# Patient Record
Sex: FEMALE | Race: BLACK OR AFRICAN AMERICAN | HISPANIC OR LATINO | Employment: OTHER | URBAN - METROPOLITAN AREA
[De-identification: names, ages, dates, MRNs, and addresses within clinical notes are randomized per-mention and may not be internally consistent; named-entity substitution may affect disease eponyms.]

---

## 2022-04-27 ENCOUNTER — TELEPHONE (OUTPATIENT)
Dept: ORTHOPEDIC SURGERY | Facility: CLINIC | Age: 57
End: 2022-04-27

## 2022-04-27 NOTE — TELEPHONE ENCOUNTER
EVALUATION AFTER BILATERAL KNEE REPLACEMENT, SURGERY WAS PERFORMED BY JAYJAY MCGILL AT [facility]. INDEXED OPERATIVE REPORT 11.20.21, 11.19.21 & 10.23.20, PATHOLOGY REPORT 11.19.21 &10.23.20. PROG NOTE 11.19.21 & 10.23.20. DC SUMMARY 11.21.21 & 10.24.20. XRAY KNEE 11.21.21 & 10.23.20. ALL FROM DR JAYJAY MCGILL.

## 2022-04-28 NOTE — TELEPHONE ENCOUNTER
PER DR LUGO, IF PATIENT IS HAVING PROBLEMS POST SURGERY THEN NO, IF JUST A NORMAL FOLLOW UP THEN YES HE IS OK TO SEE.

## 2022-05-25 ENCOUNTER — TELEPHONE (OUTPATIENT)
Dept: ORTHOPEDIC SURGERY | Facility: CLINIC | Age: 57
End: 2022-05-25

## 2022-05-25 NOTE — TELEPHONE ENCOUNTER
Caller: MILTON HERCULES     Relationship: REFERRED TO OFFICE    Best call back number: [phone]    What form or medical record are you requesting: PATIENTS OP NOTES     Who is requesting this form or medical record from you: REFERRED TO PROVIDER    How would you like to receive the form or medical records (pick-up, mail, fax): FAX   If fax, what is the fax number: [phone] ATTN_ DELISA GERTRUDIS      Timeframe paperwork needed: ASAP    Additional notes: OP NOTES ARE NOT APPEARING IN CARE EVERYWHERE- JUST NEED NOTES SENT TO OFFICE FOR PATIENT. TY

## 2023-03-31 ENCOUNTER — APPOINTMENT (OUTPATIENT)
Dept: CT IMAGING | Facility: HOSPITAL | Age: 58
End: 2023-03-31
Payer: OTHER GOVERNMENT

## 2023-03-31 ENCOUNTER — HOSPITAL ENCOUNTER (EMERGENCY)
Facility: HOSPITAL | Age: 58
Discharge: HOME | End: 2023-04-01
Attending: EMERGENCY MEDICINE | Admitting: EMERGENCY MEDICINE
Payer: OTHER GOVERNMENT

## 2023-03-31 VITALS
WEIGHT: 190.04 LBS | RESPIRATION RATE: 18 BRPM | HEART RATE: 90 BPM | HEIGHT: 66 IN | DIASTOLIC BLOOD PRESSURE: 88 MMHG | OXYGEN SATURATION: 100 % | TEMPERATURE: 97.9 F | BODY MASS INDEX: 30.54 KG/M2 | SYSTOLIC BLOOD PRESSURE: 127 MMHG

## 2023-03-31 DIAGNOSIS — K59.02 CONSTIPATION DUE TO OUTLET SYNDROME: ICD-10-CM

## 2023-03-31 DIAGNOSIS — K76.89 HEPATIC CYST: Primary | ICD-10-CM

## 2023-03-31 DIAGNOSIS — K58.1 IRRITABLE BOWEL SYNDROME WITH CONSTIPATION: ICD-10-CM

## 2023-03-31 LAB
ALBUMIN SERPL-MCNC: 4 G/DL (ref 3.5–5.2)
ALBUMIN/GLOB SERPL: 1.2 G/DL
ALP SERPL-CCNC: 90 U/L (ref 39–117)
ALT SERPL W P-5'-P-CCNC: 13 U/L (ref 1–33)
ANION GAP SERPL CALCULATED.3IONS-SCNC: 12.1 MMOL/L (ref 5–15)
AST SERPL-CCNC: 20 U/L (ref 1–32)
BACTERIA UR QL AUTO: ABNORMAL /HPF
BASOPHILS # BLD AUTO: 0.02 10*3/MM3 (ref 0–0.2)
BASOPHILS NFR BLD AUTO: 0.2 % (ref 0–1.5)
BILIRUB SERPL-MCNC: 0.2 MG/DL (ref 0–1.2)
BILIRUB UR QL STRIP: NEGATIVE
BUN SERPL-MCNC: 14 MG/DL (ref 6–20)
BUN/CREAT SERPL: 14.9 (ref 7–25)
CALCIUM SPEC-SCNC: 9 MG/DL (ref 8.6–10.5)
CHLORIDE SERPL-SCNC: 108 MMOL/L (ref 98–107)
CLARITY UR: CLEAR
CO2 SERPL-SCNC: 18.9 MMOL/L (ref 22–29)
COLOR UR: YELLOW
CREAT SERPL-MCNC: 0.94 MG/DL (ref 0.57–1)
D-LACTATE SERPL-SCNC: 1.5 MMOL/L (ref 0.5–2)
DEPRECATED RDW RBC AUTO: 43 FL (ref 37–54)
EGFRCR SERPLBLD CKD-EPI 2021: 71.8 ML/MIN/1.73
EOSINOPHIL # BLD AUTO: 0.03 10*3/MM3 (ref 0–0.4)
EOSINOPHIL NFR BLD AUTO: 0.3 % (ref 0.3–6.2)
ERYTHROCYTE [DISTWIDTH] IN BLOOD BY AUTOMATED COUNT: 14.4 % (ref 12.3–15.4)
GLOBULIN UR ELPH-MCNC: 3.3 GM/DL
GLUCOSE SERPL-MCNC: 81 MG/DL (ref 65–99)
GLUCOSE UR STRIP-MCNC: NEGATIVE MG/DL
HCT VFR BLD AUTO: 42.7 % (ref 34–46.6)
HGB BLD-MCNC: 13.6 G/DL (ref 12–15.9)
HGB UR QL STRIP.AUTO: NEGATIVE
HOLD SPECIMEN: NORMAL
HOLD SPECIMEN: NORMAL
HYALINE CASTS UR QL AUTO: ABNORMAL /LPF
IMM GRANULOCYTES # BLD AUTO: 0.02 10*3/MM3 (ref 0–0.05)
IMM GRANULOCYTES NFR BLD AUTO: 0.2 % (ref 0–0.5)
KETONES UR QL STRIP: ABNORMAL
LEUKOCYTE ESTERASE UR QL STRIP.AUTO: ABNORMAL
LIPASE SERPL-CCNC: 23 U/L (ref 13–60)
LYMPHOCYTES # BLD AUTO: 1.48 10*3/MM3 (ref 0.7–3.1)
LYMPHOCYTES NFR BLD AUTO: 16.8 % (ref 19.6–45.3)
MCH RBC QN AUTO: 26.4 PG (ref 26.6–33)
MCHC RBC AUTO-ENTMCNC: 31.9 G/DL (ref 31.5–35.7)
MCV RBC AUTO: 82.8 FL (ref 79–97)
MONOCYTES # BLD AUTO: 0.4 10*3/MM3 (ref 0.1–0.9)
MONOCYTES NFR BLD AUTO: 4.5 % (ref 5–12)
NEUTROPHILS NFR BLD AUTO: 6.85 10*3/MM3 (ref 1.7–7)
NEUTROPHILS NFR BLD AUTO: 78 % (ref 42.7–76)
NITRITE UR QL STRIP: NEGATIVE
NRBC BLD AUTO-RTO: 0 /100 WBC (ref 0–0.2)
PH UR STRIP.AUTO: 6 [PH] (ref 5–8)
PLATELET # BLD AUTO: 350 10*3/MM3 (ref 140–450)
PMV BLD AUTO: 8.9 FL (ref 6–12)
POTASSIUM SERPL-SCNC: 4.1 MMOL/L (ref 3.5–5.2)
PROT SERPL-MCNC: 7.3 G/DL (ref 6–8.5)
PROT UR QL STRIP: NEGATIVE
RBC # BLD AUTO: 5.16 10*6/MM3 (ref 3.77–5.28)
RBC # UR STRIP: ABNORMAL /HPF
REF LAB TEST METHOD: ABNORMAL
SODIUM SERPL-SCNC: 139 MMOL/L (ref 136–145)
SP GR UR STRIP: 1.03 (ref 1–1.03)
SQUAMOUS #/AREA URNS HPF: ABNORMAL /HPF
UROBILINOGEN UR QL STRIP: ABNORMAL
WBC # UR STRIP: ABNORMAL /HPF
WBC NRBC COR # BLD: 8.8 10*3/MM3 (ref 3.4–10.8)
WHOLE BLOOD HOLD COAG: NORMAL
WHOLE BLOOD HOLD SPECIMEN: NORMAL

## 2023-03-31 PROCEDURE — 96375 TX/PRO/DX INJ NEW DRUG ADDON: CPT

## 2023-03-31 PROCEDURE — 83690 ASSAY OF LIPASE: CPT | Performed by: EMERGENCY MEDICINE

## 2023-03-31 PROCEDURE — 74176 CT ABD & PELVIS W/O CONTRAST: CPT

## 2023-03-31 PROCEDURE — 36415 COLL VENOUS BLD VENIPUNCTURE: CPT | Performed by: EMERGENCY MEDICINE

## 2023-03-31 PROCEDURE — 96374 THER/PROPH/DIAG INJ IV PUSH: CPT

## 2023-03-31 PROCEDURE — 85025 COMPLETE CBC W/AUTO DIFF WBC: CPT | Performed by: EMERGENCY MEDICINE

## 2023-03-31 PROCEDURE — 83605 ASSAY OF LACTIC ACID: CPT | Performed by: EMERGENCY MEDICINE

## 2023-03-31 PROCEDURE — 81001 URINALYSIS AUTO W/SCOPE: CPT | Performed by: EMERGENCY MEDICINE

## 2023-03-31 PROCEDURE — 25010000002 KETOROLAC TROMETHAMINE PER 15 MG

## 2023-03-31 PROCEDURE — 80053 COMPREHEN METABOLIC PANEL: CPT | Performed by: EMERGENCY MEDICINE

## 2023-03-31 PROCEDURE — 25010000002 ONDANSETRON PER 1 MG: Performed by: NURSE PRACTITIONER

## 2023-03-31 PROCEDURE — 99284 EMERGENCY DEPT VISIT MOD MDM: CPT

## 2023-03-31 RX ORDER — KETOROLAC TROMETHAMINE 30 MG/ML
30 INJECTION, SOLUTION INTRAMUSCULAR; INTRAVENOUS ONCE
Status: COMPLETED | OUTPATIENT
Start: 2023-03-31 | End: 2023-03-31

## 2023-03-31 RX ORDER — SODIUM CHLORIDE 0.9 % (FLUSH) 0.9 %
10 SYRINGE (ML) INJECTION AS NEEDED
Status: DISCONTINUED | OUTPATIENT
Start: 2023-03-31 | End: 2023-04-01 | Stop reason: HOSPADM

## 2023-03-31 RX ORDER — DICYCLOMINE HCL 20 MG
20 TABLET ORAL 2 TIMES DAILY
Qty: 60 TABLET | Refills: 0 | Status: SHIPPED | OUTPATIENT
Start: 2023-03-31

## 2023-03-31 RX ORDER — KETOROLAC TROMETHAMINE 10 MG/1
10 TABLET, FILM COATED ORAL EVERY 6 HOURS PRN
Qty: 15 TABLET | Refills: 0 | Status: SHIPPED | OUTPATIENT
Start: 2023-03-31

## 2023-03-31 RX ORDER — ONDANSETRON 2 MG/ML
4 INJECTION INTRAMUSCULAR; INTRAVENOUS ONCE
Status: COMPLETED | OUTPATIENT
Start: 2023-03-31 | End: 2023-03-31

## 2023-03-31 RX ADMIN — KETOROLAC TROMETHAMINE 30 MG: 30 INJECTION, SOLUTION INTRAMUSCULAR; INTRAVENOUS at 19:20

## 2023-03-31 RX ADMIN — SODIUM CHLORIDE 1000 ML: 9 INJECTION, SOLUTION INTRAVENOUS at 19:22

## 2023-03-31 RX ADMIN — ONDANSETRON 4 MG: 2 INJECTION INTRAMUSCULAR; INTRAVENOUS at 19:21

## 2023-03-31 NOTE — ED PROVIDER NOTES
Time: 6:41 PM EDT  Date of encounter:  3/31/2023  Independent Historian/Clinical History and Information was obtained by:   Patient  Chief Complaint   Patient presents with   • Abdominal Pain     c/o abd pain with n/v today.       History is limited by: N/A    History of Present Illness:  Patient is a 57 y.o. year old female who presents to the emergency department for evaluation of sharp LLQ abd pain started this afternoon.  Patient admits to nausea, diaphoresis and 1 episode of vomiting.  Patient has a history of IBS-C and takes daily stool softeners and another medication.  She states she takes hydrocodone daily prescribed by pain management for her knee and back pains.  Hx of  with scar revision in the past. Denies dysuria.  Patient denies dysuria, hematuria, recent travel, changes in food history of diverticulitis or Crohn's.  Patient denies history of ovarian cyst    HPI    Patient Care Team  Primary Care Provider: Xiang Rico MD    Past Medical History:     Allergies   Allergen Reactions   • Adhesive Tape Rash   • Percocet [Oxycodone-Acetaminophen] Hives     Past Medical History:   Diagnosis Date   • Anxiety    • Arthritis    • Depression    • Injury of back    • Migraines    • PTSD (post-traumatic stress disorder)      Past Surgical History:   Procedure Laterality Date   • ABDOMINAL SURGERY     • CARPAL TUNNEL RELEASE     •  SECTION     • KNEE ACL RECONSTRUCTION     • OTHER SURGICAL HISTORY      right dorsal compartment release   • REPLACEMENT TOTAL KNEE BILATERAL     • REVISION OF ABDOMINAL SCAR     • TONSILLECTOMY       History reviewed. No pertinent family history.    Home Medications:  Prior to Admission medications    Medication Sig Start Date End Date Taking? Authorizing Provider   benzonatate (TESSALON) 200 MG capsule Take 1 capsule by mouth 3 (Three) Times a Day As Needed for Cough. 6/10/22   Sanjuanita Myrick APRN   buPROPion (WELLBUTRIN) 100 MG tablet Take 1 tablet by  mouth 2 (Two) Times a Day.    Yelitza Crook MD   carboxymethylcellulose (REFRESH PLUS) 0.5 % solution 3 (Three) Times a Day As Needed for Dry Eyes.    Yelitza Crook MD   celecoxib (CeleBREX) 50 MG capsule Take 50 mg by mouth.    Nurse Scott Jurado RN   cyclobenzaprine (FLEXERIL) 10 MG tablet Take 1 tablet by mouth 3 (Three) Times a Day As Needed for Muscle Spasms.    Yelitza Crook MD   docusate sodium 100 MG capsule Take 100 mg by mouth.    Nurse Scott Jurado RN   dronabinol (MARINOL) 5 MG capsule Take 5 mg by mouth 2 (Two) Times a Day Before Meals.    Nurse Scott Jurado RN   escitalopram (LEXAPRO) 20 MG tablet Take 20 mg by mouth.    Nurse Scott Jurado RN   HYDROcodone-acetaminophen (NORCO)  MG per tablet Take 1 tablet by mouth Every 6 (Six) Hours As Needed for Moderate Pain.    Yelitza Crook MD   Lidocaine (HM Lidocaine Patch) 4 % patch Apply  topically.    Yelitza Crook MD   meclizine (ANTIVERT) 25 MG tablet Take 25 mg by mouth.    Nurse Scott Jurado RN   metoclopramide (REGLAN) 10 MG tablet Take 1 tablet by mouth 4 (Four) Times a Day Before Meals & at Bedtime.    Yelitza Crook MD   ondansetron (ZOFRAN) 4 MG tablet Take  by mouth.    Nurse Scott Jurado RN   SUMAtriptan-naproxen (TREXIMET)  MG per tablet Take 1 tablet by mouth.    Nurse Scott Jurado RN   topiramate (Topamax) 100 MG tablet Take 1 tablet by mouth 2 (Two) Times a Day.    Yelitza Crook MD   traMADol (ULTRAM) 50 MG tablet Take  by mouth.    Nurse Scott Jurado RN        Social History:   Social History     Tobacco Use   • Smoking status: Never   • Smokeless tobacco: Never   Vaping Use   • Vaping Use: Never used   Substance Use Topics   • Alcohol use: Never   • Drug use: Never         Review of Systems:  Review of Systems   Constitutional: Positive for diaphoresis. Negative for fever.   HENT: Negative.    Eyes: Negative.    Respiratory: Negative.   "  Cardiovascular: Negative.    Gastrointestinal: Positive for abdominal pain, nausea and vomiting. Negative for diarrhea.   Endocrine: Negative.    Genitourinary: Negative.  Negative for dysuria and hematuria.   Musculoskeletal: Negative.    Skin: Negative.    Allergic/Immunologic: Negative.    Neurological: Negative.    Hematological: Negative.    Psychiatric/Behavioral: Negative.         Physical Exam:  /88   Pulse 90   Temp 97.9 °F (36.6 °C) (Oral)   Resp 18   Ht 167.6 cm (66\")   Wt 86.2 kg (190 lb 0.6 oz)   SpO2 100%   BMI 30.67 kg/m²     Physical Exam  Vitals and nursing note reviewed.   Constitutional:       Appearance: Normal appearance.   HENT:      Head: Normocephalic and atraumatic.      Nose: Nose normal.      Mouth/Throat:      Mouth: Mucous membranes are moist.   Eyes:      Extraocular Movements: Extraocular movements intact.      Conjunctiva/sclera: Conjunctivae normal.      Pupils: Pupils are equal, round, and reactive to light.   Cardiovascular:      Rate and Rhythm: Normal rate and regular rhythm.      Heart sounds: Normal heart sounds.   Pulmonary:      Effort: Pulmonary effort is normal.      Breath sounds: Normal breath sounds.   Abdominal:      General: Abdomen is flat. Bowel sounds are normal.      Palpations: Abdomen is soft.      Tenderness: There is abdominal tenderness (LLQ). There is no guarding or rebound.   Musculoskeletal:         General: Normal range of motion.      Cervical back: Normal range of motion and neck supple.   Skin:     General: Skin is warm and dry.   Neurological:      General: No focal deficit present.      Mental Status: She is alert and oriented to person, place, and time.   Psychiatric:         Mood and Affect: Mood normal.         Behavior: Behavior normal.                  Procedures:  Procedures      Medical Decision Making:      Comorbidities that affect care:    IBS-C    External Notes reviewed:    None      The following orders were placed and all " results were independently analyzed by me:  Orders Placed This Encounter   Procedures   • CT Abdomen Pelvis Without Contrast   • Tillson Draw   • Comprehensive Metabolic Panel   • Lipase   • Urinalysis With Microscopic If Indicated (No Culture) - Urine, Clean Catch   • Lactic Acid, Plasma   • CBC Auto Differential   • Urinalysis, Microscopic Only - Urine, Clean Catch   • NPO Diet NPO Type: Strict NPO   • Undress & Gown   • Insert Peripheral IV   • Insert Peripheral IV   • CBC & Differential   • Green Top (Gel)   • Lavender Top   • Gold Top - SST   • Light Blue Top       Medications Given in the Emergency Department:  Medications   sodium chloride 0.9 % flush 10 mL (has no administration in time range)   sodium chloride 0.9 % flush 10 mL (has no administration in time range)   sodium chloride 0.9 % bolus 1,000 mL (0 mL Intravenous Stopped 3/31/23 2127)   ondansetron (ZOFRAN) injection 4 mg (4 mg Intravenous Given 3/31/23 1921)   ketorolac (TORADOL) injection 30 mg (30 mg Intravenous Given 3/31/23 1920)        ED Course:    The patient was initially evaluated in the triage area where orders were placed. The patient was later dispositioned by Larry Koehler PA-C.      The patient was advised to stay for completion of workup which includes but is not limited to communication of labs and radiological results, reassessment and plan. The patient was advised that leaving prior to disposition by a provider could result in critical findings that are not communicated to the patient.     ED Course as of 03/31/23 2337   Fri Mar 31, 2023   1839 The patient was seen and examined by me, PIOTR Dasilva, while in triage. Orders placed. Patient is awaiting disposition.   [AR]   2332 Had extensive talk with the patient by her CT findings of a liver cyst along with constipation.  Patient states that she has had Reglan in the past that has not made her constipation worse and she would like to be prescribed that along with  GoLytely.  She will follow-up with her GI doctor along with her primary care for the findings.  [AJ]      ED Course User Index  [AJ] Larry Koehler PA-C  [AR] Miranda Stone APRN       Labs:    Lab Results (last 24 hours)     Procedure Component Value Units Date/Time    CBC & Differential [[id number]]  (Abnormal) Collected: 03/31/23 1854    Specimen: Blood from Arm, Right Updated: 03/31/23 1906    Narrative:      The following orders were created for panel order CBC & Differential.  Procedure                               Abnormality         Status                     ---------                               -----------         ------                     CBC Auto Differential[[id number]]        Abnormal            Final result                 Please view results for these tests on the individual orders.    Lipase [[id number]]  (Normal) Collected: 03/31/23 1854    Specimen: Blood from Arm, Right Updated: 03/31/23 1923     Lipase 23 U/L     CBC Auto Differential [[id number]]  (Abnormal) Collected: 03/31/23 1854    Specimen: Blood from Arm, Right Updated: 03/31/23 1906     WBC 8.80 10*3/mm3      RBC 5.16 10*6/mm3      Hemoglobin 13.6 g/dL      Hematocrit 42.7 %      MCV 82.8 fL      MCH 26.4 pg      MCHC 31.9 g/dL      RDW 14.4 %      RDW-SD 43.0 fl      MPV 8.9 fL      Platelets 350 10*3/mm3      Neutrophil % 78.0 %      Lymphocyte % 16.8 %      Monocyte % 4.5 %      Eosinophil % 0.3 %      Basophil % 0.2 %      Immature Grans % 0.2 %      Neutrophils, Absolute 6.85 10*3/mm3      Lymphocytes, Absolute 1.48 10*3/mm3      Monocytes, Absolute 0.40 10*3/mm3      Eosinophils, Absolute 0.03 10*3/mm3      Basophils, Absolute 0.02 10*3/mm3      Immature Grans, Absolute 0.02 10*3/mm3      nRBC 0.0 /100 WBC     Comprehensive Metabolic Panel [[id number]]  (Abnormal) Collected: 03/31/23 2126    Specimen: Blood from Hand, Left Updated: 03/31/23 2217     Glucose 81 mg/dL      BUN 14 mg/dL      Creatinine 0.94 mg/dL      Sodium  139 mmol/L      Potassium 4.1 mmol/L      Comment: Slight hemolysis detected by analyzer. Results may be affected.        Chloride 108 mmol/L      CO2 18.9 mmol/L      Calcium 9.0 mg/dL      Total Protein 7.3 g/dL      Albumin 4.0 g/dL      ALT (SGPT) 13 U/L      AST (SGOT) 20 U/L      Comment: Slight hemolysis detected by analyzer. Results may be affected.        Alkaline Phosphatase 90 U/L      Total Bilirubin 0.2 mg/dL      Globulin 3.3 gm/dL      A/G Ratio 1.2 g/dL      BUN/Creatinine Ratio 14.9     Anion Gap 12.1 mmol/L      eGFR 71.8 mL/min/1.73     Narrative:      GFR Normal >60  Chronic Kidney Disease <60  Kidney Failure <15      Lactic Acid, Plasma [[id number]]  (Normal) Collected: 03/31/23 2126    Specimen: Blood from Hand, Left Updated: 03/31/23 2214     Lactate 1.5 mmol/L     Urinalysis With Microscopic If Indicated (No Culture) - Urine, Clean Catch [[id number]]  (Abnormal) Collected: 03/31/23 2159    Specimen: Urine, Clean Catch Updated: 03/31/23 2235     Color, UA Yellow     Appearance, UA Clear     pH, UA 6.0     Specific Gravity, UA 1.026     Glucose, UA Negative     Ketones, UA Trace     Bilirubin, UA Negative     Blood, UA Negative     Protein, UA Negative     Leuk Esterase, UA Trace     Nitrite, UA Negative     Urobilinogen, UA 1.0 E.U./dL    Urinalysis, Microscopic Only - Urine, Clean Catch [[id number]]  (Abnormal) Collected: 03/31/23 2159    Specimen: Urine, Clean Catch Updated: 03/31/23 2319     RBC, UA None Seen /HPF      WBC, UA 3-5 /HPF      Bacteria, UA None Seen /HPF      Squamous Epithelial Cells, UA 0-2 /HPF      Hyaline Casts, UA 0-2 /LPF      Methodology Manual Light Microscopy           Imaging:    CT Abdomen Pelvis Without Contrast    Result Date: 3/31/2023  PROCEDURE: CT ABDOMEN PELVIS WO CONTRAST  COMPARISON: None  INDICATIONS: LLQ abd pain  TECHNIQUE: CT images were created without intravenous contrast.   PROTOCOL:   Standard imaging protocol performed    RADIATION:   DLP: 477mGy*cm    Automated exposure control was utilized to minimize radiation dose.  FINDINGS:  There are hepatic lesions suggestive of cysts.  Lesion in right lobe might relate to hemangioma.  There is no definite abnormality of the spleen or acute abnormality of the pancreas.  The kidneys are grossly unremarkable.  The bladder does not appear abnormal for the degree of distention.  There is a moderate stool burden which could relate to constipation.  There are multilevel degenerative changes involving the lumbar spine most pronounced at L3-4 and L4-5.        1. Hepatic lesions that may relate to cyst and hemangioma. 2. Moderate stool burden which could relate to constipation. 3. Multilevel degenerative changes lumbar spine.     IRMA BARAJAS MD       Electronically Signed and Approved By: IRMA BARAJAS MD on 3/31/2023 at 19:56                 Differential Diagnosis and Discussion:      Abdominal Pain: Based on the patient's signs and symptoms, I considered abdominal aortic aneurysm, small bowel obstruction, pancreatitis, acute cholecystitis, acute appendecitis, peptic ulcer disease, gastritis, colitis, endocrine disorders, irritable bowel syndrome and other differential diagnosis an etiology of the patient's abdominal pain.    All labs were reviewed and interpreted by me.  CT scan radiology interpretation was reviewed by me.    [facility]     Patient Care Considerations:    NARCOTICS: I considered prescribing opiate pain medication as an outpatient, however However due to patient's history of IBS-C decided to go with a different option.      Consultants/Shared Management Plan:    None    Social Determinants of Health:    Patient is independent, reliable, and has access to care.       Disposition and Care Coordination:    Discharged: The patient is suitable and stable for discharge with no need for consideration of observation or admission.    I have explained the patient´s condition, diagnoses and treatment plan based on the information  available to me at this time. I have answered questions and addressed any concerns. The patient has a good  understanding of the patient´s diagnosis, condition, and treatment plan as can be expected at this point. The vital signs have been stable. The patient´s condition is stable and appropriate for discharge from the emergency department.      The patient will pursue further outpatient evaluation with the primary care physician or other designated or consulting physician as outlined in the discharge instructions. They are agreeable to this plan of care and follow-up instructions have been explained in detail. The patient has received these instructions in written format and have expressed an understanding of the discharge instructions. The patient is aware that any significant change in condition or worsening of symptoms should prompt an immediate return to this or the closest emergency department or call to 911.  I have explained discharge medications and the need for follow up with the patient/caretakers. This was also printed in the discharge instructions. Patient was discharged with the following medications and follow up:      Medication List      New Prescriptions    dicyclomine 20 MG tablet  Commonly known as: BENTYL  Take 1 tablet by mouth 2 (Two) Times a Day.     ketorolac 10 MG tablet  Commonly known as: TORADOL  Take 1 tablet by mouth Every 6 (Six) Hours As Needed for Moderate Pain.     polyethylene glycol 236 g solution  Commonly known as: GoLYTELY  Take 240 mL by mouth 1 (One) Time for 1 dose.  Start taking on: April 1, 2023           Where to Get Your Medications      These medications were sent to [facility]/pharmacy #[id number] - [place], [place] - [address] - [phone] [facility] [phone] FX  [address] [place] [address]    Hours: 24-hours Phone: [phone]   · dicyclomine 20 MG tablet  · ketorolac 10 MG tablet  · polyethylene glycol 236 g solution      No follow-up provider specified.      Final diagnoses:   Hepatic cyst   Irritable bowel syndrome with constipation (hx of)   Constipation due to outlet syndrome        ED Disposition     ED Disposition   Discharge    Condition   Stable    Comment   --             This medical record created using voice recognition software.           Larry Koehler PA-C  03/31/23 0843

## 2023-04-01 NOTE — DISCHARGE INSTRUCTIONS
Please take Bentyl as needed for abdominal pain and cramping  Please drink lots of fluids and increase your fiber intake  Please follow-up with your GI doctor for IBS-C  Please follow-up with your primary care for herpetic cyst.

## 2023-04-11 ENCOUNTER — TRANSCRIBE ORDERS (OUTPATIENT)
Dept: ADMINISTRATIVE | Facility: HOSPITAL | Age: 58
End: 2023-04-11
Payer: OTHER GOVERNMENT

## 2023-04-11 DIAGNOSIS — U09.9 POST-COVID SYNDROME: Primary | ICD-10-CM

## 2023-04-11 DIAGNOSIS — R91.1 PULMONARY NODULE: ICD-10-CM

## 2023-05-12 ENCOUNTER — HOSPITAL ENCOUNTER (OUTPATIENT)
Dept: CT IMAGING | Facility: HOSPITAL | Age: 58
Discharge: HOME | End: 2023-05-12
Payer: OTHER GOVERNMENT

## 2023-05-12 ENCOUNTER — HOSPITAL ENCOUNTER (OUTPATIENT)
Dept: RESPIRATORY THERAPY | Facility: HOSPITAL | Age: 58
Discharge: HOME | End: 2023-05-12
Payer: OTHER GOVERNMENT

## 2023-05-12 DIAGNOSIS — U09.9 POST-COVID SYNDROME: ICD-10-CM

## 2023-05-12 PROCEDURE — 71250 CT THORAX DX C-: CPT

## 2023-05-12 PROCEDURE — 94010 BREATHING CAPACITY TEST: CPT

## 2023-05-12 PROCEDURE — 94726 PLETHYSMOGRAPHY LUNG VOLUMES: CPT

## 2024-01-16 ENCOUNTER — TRANSCRIBE ORDERS (OUTPATIENT)
Dept: ADMINISTRATIVE | Facility: HOSPITAL | Age: 59
End: 2024-01-16
Payer: OTHER GOVERNMENT

## 2024-01-16 DIAGNOSIS — K76.9 LIVER LESION: ICD-10-CM

## 2024-01-16 DIAGNOSIS — R10.9 STOMACH ACHE: Primary | ICD-10-CM

## 2024-02-13 ENCOUNTER — HOSPITAL ENCOUNTER (OUTPATIENT)
Dept: MRI IMAGING | Facility: HOSPITAL | Age: 59
Discharge: HOME | End: 2024-02-13
Admitting: INTERNAL MEDICINE
Payer: OTHER GOVERNMENT

## 2024-02-13 DIAGNOSIS — R10.9 STOMACH ACHE: ICD-10-CM

## 2024-02-13 DIAGNOSIS — K76.9 LIVER LESION: ICD-10-CM

## 2024-10-19 ENCOUNTER — APPOINTMENT (OUTPATIENT)
Dept: GENERAL RADIOLOGY | Facility: HOSPITAL | Age: 59
End: 2024-10-19
Payer: OTHER GOVERNMENT

## 2024-10-19 ENCOUNTER — HOSPITAL ENCOUNTER (EMERGENCY)
Facility: HOSPITAL | Age: 59
Discharge: HOME | End: 2024-10-20
Attending: EMERGENCY MEDICINE | Admitting: EMERGENCY MEDICINE
Payer: OTHER GOVERNMENT

## 2024-10-19 DIAGNOSIS — R10.9 ABDOMINAL PAIN, UNSPECIFIED ABDOMINAL LOCATION: Primary | ICD-10-CM

## 2024-10-19 DIAGNOSIS — K58.9 IRRITABLE BOWEL SYNDROME WITHOUT DIARRHEA: ICD-10-CM

## 2024-10-19 DIAGNOSIS — K59.00 CONSTIPATION, UNSPECIFIED CONSTIPATION TYPE: ICD-10-CM

## 2024-10-19 LAB
ALBUMIN SERPL-MCNC: 4.2 G/DL (ref 3.5–5.2)
ALBUMIN/GLOB SERPL: 1.2 G/DL
ALP SERPL-CCNC: 92 U/L (ref 39–117)
ALT SERPL W P-5'-P-CCNC: 14 U/L (ref 1–33)
ANION GAP SERPL CALCULATED.3IONS-SCNC: 12.1 MMOL/L (ref 5–15)
AST SERPL-CCNC: 22 U/L (ref 1–32)
BASOPHILS # BLD AUTO: 0.03 10*3/MM3 (ref 0–0.2)
BASOPHILS NFR BLD AUTO: 0.5 % (ref 0–1.5)
BILIRUB SERPL-MCNC: 0.2 MG/DL (ref 0–1.2)
BUN SERPL-MCNC: 17 MG/DL (ref 6–20)
BUN/CREAT SERPL: 19.5 (ref 7–25)
CALCIUM SPEC-SCNC: 9.6 MG/DL (ref 8.6–10.5)
CHLORIDE SERPL-SCNC: 106 MMOL/L (ref 98–107)
CO2 SERPL-SCNC: 20.9 MMOL/L (ref 22–29)
CREAT SERPL-MCNC: 0.87 MG/DL (ref 0.57–1)
D-LACTATE SERPL-SCNC: 1.5 MMOL/L (ref 0.5–2)
DEPRECATED RDW RBC AUTO: 43.2 FL (ref 37–54)
EGFRCR SERPLBLD CKD-EPI 2021: 77.8 ML/MIN/1.73
EOSINOPHIL # BLD AUTO: 0.05 10*3/MM3 (ref 0–0.4)
EOSINOPHIL NFR BLD AUTO: 0.8 % (ref 0.3–6.2)
ERYTHROCYTE [DISTWIDTH] IN BLOOD BY AUTOMATED COUNT: 14.4 % (ref 12.3–15.4)
GLOBULIN UR ELPH-MCNC: 3.4 GM/DL
GLUCOSE SERPL-MCNC: 111 MG/DL (ref 65–99)
HCT VFR BLD AUTO: 40.3 % (ref 34–46.6)
HGB BLD-MCNC: 12.8 G/DL (ref 12–15.9)
HOLD SPECIMEN: NORMAL
HOLD SPECIMEN: NORMAL
IMM GRANULOCYTES # BLD AUTO: 0.01 10*3/MM3 (ref 0–0.05)
IMM GRANULOCYTES NFR BLD AUTO: 0.2 % (ref 0–0.5)
LIPASE SERPL-CCNC: 26 U/L (ref 13–60)
LYMPHOCYTES # BLD AUTO: 3.36 10*3/MM3 (ref 0.7–3.1)
LYMPHOCYTES NFR BLD AUTO: 50.6 % (ref 19.6–45.3)
MCH RBC QN AUTO: 26.3 PG (ref 26.6–33)
MCHC RBC AUTO-ENTMCNC: 31.8 G/DL (ref 31.5–35.7)
MCV RBC AUTO: 82.8 FL (ref 79–97)
MONOCYTES # BLD AUTO: 0.68 10*3/MM3 (ref 0.1–0.9)
MONOCYTES NFR BLD AUTO: 10.2 % (ref 5–12)
NEUTROPHILS NFR BLD AUTO: 2.51 10*3/MM3 (ref 1.7–7)
NEUTROPHILS NFR BLD AUTO: 37.7 % (ref 42.7–76)
NRBC BLD AUTO-RTO: 0 /100 WBC (ref 0–0.2)
PLATELET # BLD AUTO: 348 10*3/MM3 (ref 140–450)
PMV BLD AUTO: 9 FL (ref 6–12)
POTASSIUM SERPL-SCNC: 3.7 MMOL/L (ref 3.5–5.2)
PROT SERPL-MCNC: 7.6 G/DL (ref 6–8.5)
RBC # BLD AUTO: 4.87 10*6/MM3 (ref 3.77–5.28)
SODIUM SERPL-SCNC: 139 MMOL/L (ref 136–145)
WBC NRBC COR # BLD AUTO: 6.64 10*3/MM3 (ref 3.4–10.8)
WHOLE BLOOD HOLD COAG: NORMAL
WHOLE BLOOD HOLD SPECIMEN: NORMAL

## 2024-10-19 PROCEDURE — 80053 COMPREHEN METABOLIC PANEL: CPT | Performed by: EMERGENCY MEDICINE

## 2024-10-19 PROCEDURE — 85025 COMPLETE CBC W/AUTO DIFF WBC: CPT | Performed by: EMERGENCY MEDICINE

## 2024-10-19 PROCEDURE — 96374 THER/PROPH/DIAG INJ IV PUSH: CPT

## 2024-10-19 PROCEDURE — 25010000002 ONDANSETRON PER 1 MG: Performed by: EMERGENCY MEDICINE

## 2024-10-19 PROCEDURE — 36415 COLL VENOUS BLD VENIPUNCTURE: CPT

## 2024-10-19 PROCEDURE — 74019 RADEX ABDOMEN 2 VIEWS: CPT

## 2024-10-19 PROCEDURE — 83690 ASSAY OF LIPASE: CPT | Performed by: EMERGENCY MEDICINE

## 2024-10-19 PROCEDURE — 83605 ASSAY OF LACTIC ACID: CPT | Performed by: EMERGENCY MEDICINE

## 2024-10-19 PROCEDURE — 99284 EMERGENCY DEPT VISIT MOD MDM: CPT

## 2024-10-19 RX ORDER — SODIUM CHLORIDE 0.9 % (FLUSH) 0.9 %
10 SYRINGE (ML) INJECTION AS NEEDED
Status: DISCONTINUED | OUTPATIENT
Start: 2024-10-19 | End: 2024-10-20 | Stop reason: HOSPADM

## 2024-10-19 RX ORDER — ONDANSETRON 2 MG/ML
4 INJECTION INTRAMUSCULAR; INTRAVENOUS ONCE
Status: COMPLETED | OUTPATIENT
Start: 2024-10-19 | End: 2024-10-19

## 2024-10-19 RX ORDER — LUBIPROSTONE 8 UG/1
8 CAPSULE ORAL ONCE
Status: COMPLETED | OUTPATIENT
Start: 2024-10-19 | End: 2024-10-19

## 2024-10-19 RX ORDER — MAGNESIUM CARB/ALUMINUM HYDROX 105-160MG
296 TABLET,CHEWABLE ORAL ONCE
Status: COMPLETED | OUTPATIENT
Start: 2024-10-19 | End: 2024-10-19

## 2024-10-19 RX ADMIN — ONDANSETRON HYDROCHLORIDE 4 MG: 2 SOLUTION INTRAMUSCULAR; INTRAVENOUS at 22:42

## 2024-10-19 RX ADMIN — MAJOR MAGNESIUM CITRATE ORAL SOLUTION - LEMON 296 ML: 1.75 LIQUID ORAL at 23:48

## 2024-10-19 RX ADMIN — LUBIPROSTONE 8 MCG: 8 CAPSULE, GELATIN COATED ORAL at 23:47

## 2024-10-20 ENCOUNTER — APPOINTMENT (OUTPATIENT)
Dept: CT IMAGING | Facility: HOSPITAL | Age: 59
End: 2024-10-20
Payer: OTHER GOVERNMENT

## 2024-10-20 VITALS
HEART RATE: 80 BPM | RESPIRATION RATE: 20 BRPM | OXYGEN SATURATION: 100 % | SYSTOLIC BLOOD PRESSURE: 132 MMHG | TEMPERATURE: 98 F | DIASTOLIC BLOOD PRESSURE: 77 MMHG

## 2024-10-20 PROCEDURE — 74176 CT ABD & PELVIS W/O CONTRAST: CPT

## 2024-10-20 PROCEDURE — 96375 TX/PRO/DX INJ NEW DRUG ADDON: CPT

## 2024-10-20 PROCEDURE — 25010000002 KETOROLAC TROMETHAMINE PER 15 MG: Performed by: EMERGENCY MEDICINE

## 2024-10-20 RX ORDER — KETOROLAC TROMETHAMINE 30 MG/ML
30 INJECTION, SOLUTION INTRAMUSCULAR; INTRAVENOUS ONCE
Status: COMPLETED | OUTPATIENT
Start: 2024-10-20 | End: 2024-10-20

## 2024-10-20 RX ADMIN — KETOROLAC TROMETHAMINE 30 MG: 30 INJECTION, SOLUTION INTRAMUSCULAR; INTRAVENOUS at 00:33

## 2024-10-20 NOTE — ED PROVIDER NOTES
Time: 11:17 PM EDT  Date of encounter:  10/19/2024  Independent Historian/Clinical History and Information was obtained by:   Patient  Chief Complaint: Abdominal pain and nausea    History is limited by: N/A    History of Present Illness:  Patient is a 59 y.o. year old female who presents to the emergency department for evaluation of abdominal pain and nausea.  The patient notes that she has chronic IBS with constipation.  The patient states that she has had a complete workup for this and she has had it for several years.  The patient's had EGD, colonoscopy, gastric emptying study, motility studies.  The patient states that her bowel movements are not normal.  She states her last bowel movement was 2 weeks ago which is abnormal for her as well.  The patient states that she began having sharp abdominal pain tonight.  She locates it diffusely throughout the abdomen.  The patient states that she was nauseated and threw up.  She denies any coffee-ground emesis or hematemesis.  Patient denies any fever rigors or myalgias.  The patient states that she does have lightheadedness or near syncope when she gets nauseated and throws up.  She did have a spell that tonight when she was at the restaurant.  Due to her nausea and abdominal pain she came to the emergency room.  She states she does get this pain frequently but she has the severe episodes several times a year.    HPI    Patient Care Team  Primary Care Provider: Xiang Rico MD    Past Medical History:     Allergies   Allergen Reactions    Adhesive Tape Rash    Percocet [Oxycodone-Acetaminophen] Hives     Past Medical History:   Diagnosis Date    Anxiety     Arthritis     Depression     Injury of back     Migraines     PTSD (post-traumatic stress disorder)      Past Surgical History:   Procedure Laterality Date    ABDOMINAL SURGERY      CARPAL TUNNEL RELEASE       SECTION      KNEE ACL RECONSTRUCTION      OTHER SURGICAL HISTORY      right dorsal  compartment release    REPLACEMENT TOTAL KNEE BILATERAL      REVISION OF ABDOMINAL SCAR      TONSILLECTOMY       No family history on file.    Home Medications:  Prior to Admission medications    Medication Sig Start Date End Date Taking? Authorizing Provider   benzonatate (TESSALON) 200 MG capsule Take 1 capsule by mouth 3 (Three) Times a Day As Needed for Cough. 6/10/22   Sanjuanita Myrick APRN   buPROPion (WELLBUTRIN) 100 MG tablet Take 1 tablet by mouth 2 (Two) Times a Day.    Yelitza Crook MD   carboxymethylcellulose (REFRESH PLUS) 0.5 % solution 3 (Three) Times a Day As Needed for Dry Eyes.    Yelitza Crook MD   cyclobenzaprine (FLEXERIL) 10 MG tablet Take 1 tablet by mouth 3 (Three) Times a Day As Needed for Muscle Spasms.    Yelitza Crook MD   dicyclomine (BENTYL) 20 MG tablet Take 1 tablet by mouth 2 (Two) Times a Day. 3/31/23   Larry Koehler PA-C   docusate sodium 100 MG capsule Take 100 mg by mouth.    Nurse Scott Jurado RN   dronabinol (MARINOL) 5 MG capsule Take 5 mg by mouth 2 (Two) Times a Day Before Meals.    Nurse Scott Jurado RN   escitalopram (LEXAPRO) 20 MG tablet Take 20 mg by mouth.    Nurse Scott Jurado RN   HYDROcodone-acetaminophen (NORCO)  MG per tablet Take 1 tablet by mouth Every 6 (Six) Hours As Needed for Moderate Pain.    Yelitza Crook MD   ketorolac (TORADOL) 10 MG tablet Take 1 tablet by mouth Every 6 (Six) Hours As Needed for Moderate Pain. 3/31/23   Larry Koehler PA-C   Lidocaine (HM Lidocaine Patch) 4 % patch Apply  topically.    Yelitza Crook MD   meclizine (ANTIVERT) 25 MG tablet Take 25 mg by mouth.    Nurse Scott Jurado RN   metoclopramide (REGLAN) 10 MG tablet Take 1 tablet by mouth 4 (Four) Times a Day Before Meals & at Bedtime.    Yelitza Crook MD   ondansetron (ZOFRAN) 4 MG tablet Take  by mouth.    Nurse Scott Jurado RN   topiramate (Topamax) 100 MG tablet Take 1 tablet by mouth  2 (Two) Times a Day.    Provider, MD Yelitza   traMADol (ULTRAM) 50 MG tablet Take  by mouth.    Emergency, Nurse Scott, RN        Social History:   Social History     Tobacco Use    Smoking status: Never    Smokeless tobacco: Never   Vaping Use    Vaping status: Never Used   Substance Use Topics    Alcohol use: Never    Drug use: Never         Review of Systems:  Review of Systems   Constitutional:  Negative for chills, diaphoresis and fever.   HENT:  Negative for congestion, postnasal drip, rhinorrhea and sore throat.    Eyes:  Negative for photophobia.   Respiratory:  Negative for cough, chest tightness and shortness of breath.    Cardiovascular:  Negative for chest pain, palpitations and leg swelling.   Gastrointestinal:  Positive for abdominal pain, constipation, nausea and vomiting. Negative for diarrhea.   Genitourinary:  Negative for difficulty urinating, dysuria, flank pain, frequency, hematuria and urgency.   Musculoskeletal:  Negative for neck pain and neck stiffness.   Skin:  Negative for pallor and rash.   Neurological:  Negative for dizziness, syncope, weakness, numbness and headaches.   Hematological:  Negative for adenopathy. Does not bruise/bleed easily.   Psychiatric/Behavioral: Negative.          Physical Exam:  BP (!) 138/113 (BP Location: Right arm, Patient Position: Lying)   Pulse 75   Temp 98 °F (36.7 °C) (Oral)   Resp 20   SpO2 99%     Physical Exam  Vitals and nursing note reviewed.   Constitutional:       General: She is not in acute distress.     Appearance: Normal appearance. She is not ill-appearing, toxic-appearing or diaphoretic.   HENT:      Head: Normocephalic and atraumatic.      Mouth/Throat:      Mouth: Mucous membranes are moist.   Eyes:      Pupils: Pupils are equal, round, and reactive to light.   Cardiovascular:      Rate and Rhythm: Normal rate and regular rhythm.      Pulses: Normal pulses.           Carotid pulses are 2+ on the right side and 2+ on the left side.        Radial pulses are 2+ on the right side and 2+ on the left side.        Femoral pulses are 2+ on the right side and 2+ on the left side.       Popliteal pulses are 2+ on the right side and 2+ on the left side.        Dorsalis pedis pulses are 2+ on the right side and 2+ on the left side.        Posterior tibial pulses are 2+ on the right side and 2+ on the left side.      Heart sounds: Normal heart sounds. No murmur heard.  Pulmonary:      Effort: Pulmonary effort is normal. No accessory muscle usage, respiratory distress or retractions.      Breath sounds: Normal breath sounds. No wheezing, rhonchi or rales.   Abdominal:      General: Abdomen is flat. There is distension.      Palpations: Abdomen is soft. There is no mass or pulsatile mass.      Tenderness: There is generalized abdominal tenderness. There is no right CVA tenderness, left CVA tenderness, guarding or rebound.      Comments: No rigidity   Musculoskeletal:         General: No swelling, tenderness or deformity.      Cervical back: Neck supple. No tenderness.      Right lower leg: No edema.      Left lower leg: No edema.   Skin:     General: Skin is warm and dry.      Capillary Refill: Capillary refill takes less than 2 seconds.      Coloration: Skin is not jaundiced or pale.      Findings: No erythema.   Neurological:      General: No focal deficit present.      Mental Status: She is alert and oriented to person, place, and time. Mental status is at baseline.      Cranial Nerves: Cranial nerves 2-12 are intact. No cranial nerve deficit.      Sensory: Sensation is intact. No sensory deficit.      Motor: Motor function is intact. No weakness or pronator drift.      Coordination: Coordination is intact. Coordination normal.   Psychiatric:         Mood and Affect: Mood normal.         Behavior: Behavior normal.                  Procedures:  Procedures      Medical Decision Making:      Comorbidities that affect care:    Arthritis, PTSD, anxiety, IBS with  chronic constipation, depression, migraine    External Notes reviewed:    None      The following orders were placed and all results were independently analyzed by me:  Orders Placed This Encounter   Procedures    XR Abdomen Flat & Upright    CT Abdomen Pelvis Without Contrast    [place] Draw    Comprehensive Metabolic Panel    Lipase    Urinalysis With Microscopic If Indicated (No Culture) - Urine, Clean Catch    Lactic Acid, Plasma    CBC Auto Differential    NPO Diet NPO Type: Strict NPO    Undress & Gown    Soap suds enema    Insert Peripheral IV    CBC & Differential    Green Top (Gel)    Lavender Top    Gold Top - SST    Light Blue Top       Medications Given in the Emergency Department:  Medications   sodium chloride 0.9 % flush 10 mL (has no administration in time range)   ondansetron (ZOFRAN) injection 4 mg (4 mg Intravenous Given 10/19/24 2242)   magnesium citrate solution 296 mL (296 mL Oral Given 10/19/24 2348)   lubiprostone (AMITIZA) capsule 8 mcg (8 mcg Oral Given 10/19/24 2347)   ketorolac (TORADOL) injection 30 mg (30 mg Intravenous Given 10/20/24 0033)        ED Course:         Labs:    Lab Results (last 24 hours)       Procedure Component Value Units Date/Time    CBC & Differential [[id number]]  (Abnormal) Collected: 10/19/24 2111    Specimen: Blood Updated: 10/19/24 2119    Narrative:      The following orders were created for panel order CBC & Differential.  Procedure                               Abnormality         Status                     ---------                               -----------         ------                     CBC Auto Differential[[id number]]        Abnormal            Final result                 Please view results for these tests on the individual orders.    Comprehensive Metabolic Panel [[id number]]  (Abnormal) Collected: 10/19/24 2111    Specimen: Blood Updated: 10/19/24 2153     Glucose 111 mg/dL      BUN 17 mg/dL      Creatinine 0.87 mg/dL      Sodium 139 mmol/L       Potassium 3.7 mmol/L      Comment: Slight hemolysis detected by analyzer. Result may be falsely elevated.        Chloride 106 mmol/L      CO2 20.9 mmol/L      Calcium 9.6 mg/dL      Total Protein 7.6 g/dL      Albumin 4.2 g/dL      ALT (SGPT) 14 U/L      AST (SGOT) 22 U/L      Comment: Slight hemolysis detected by analyzer. Result may be falsely elevated.        Alkaline Phosphatase 92 U/L      Total Bilirubin 0.2 mg/dL      Globulin 3.4 gm/dL      A/G Ratio 1.2 g/dL      BUN/Creatinine Ratio 19.5     Anion Gap 12.1 mmol/L      eGFR 77.8 mL/min/1.73     Narrative:      GFR Normal >60  Chronic Kidney Disease <60  Kidney Failure <15      Lipase [[id number]]  (Normal) Collected: 10/19/24 2111    Specimen: Blood Updated: 10/19/24 2140     Lipase 26 U/L     Lactic Acid, Plasma [[id number]]  (Normal) Collected: 10/19/24 2111    Specimen: Blood Updated: 10/19/24 2136     Lactate 1.5 mmol/L     CBC Auto Differential [[id number]]  (Abnormal) Collected: 10/19/24 2111    Specimen: Blood Updated: 10/19/24 2119     WBC 6.64 10*3/mm3      RBC 4.87 10*6/mm3      Hemoglobin 12.8 g/dL      Hematocrit 40.3 %      MCV 82.8 fL      MCH 26.3 pg      MCHC 31.8 g/dL      RDW 14.4 %      RDW-SD 43.2 fl      MPV 9.0 fL      Platelets 348 10*3/mm3      Neutrophil % 37.7 %      Lymphocyte % 50.6 %      Monocyte % 10.2 %      Eosinophil % 0.8 %      Basophil % 0.5 %      Immature Grans % 0.2 %      Neutrophils, Absolute 2.51 10*3/mm3      Lymphocytes, Absolute 3.36 10*3/mm3      Monocytes, Absolute 0.68 10*3/mm3      Eosinophils, Absolute 0.05 10*3/mm3      Basophils, Absolute 0.03 10*3/mm3      Immature Grans, Absolute 0.01 10*3/mm3      nRBC 0.0 /100 WBC              Imaging:    CT Abdomen Pelvis Without Contrast    Result Date: 10/20/2024  CT ABDOMEN PELVIS WO CONTRAST-  Date of exam: 10/20/2024, 3:40 A.M.  Indication: Abdominal pain, generalized (for 1 day).  Comparison: 3/31/2023.  TECHNIQUE: Axial CT images were obtained of the abdomen and  pelvis without the administration of contrast. Reconstructed 2D coronal and sagittal images were also obtained. Automated exposure control and iterative construction methods were used. No oral contrast agent was administered for the study.  FINDINGS: Nonspecific fluid-filled stomach, small bowel, and colon are identified and may represent a generalized adynamic ileus. An infectious/inflammatory gastroenterocolitis cannot be excluded. There has been interval decrease in the stool burden since the 3/31/2023 study. The appendix is not clearly identified. Please correlate with the surgical history. No pneumoperitoneum. No pneumatosis or portal or mesenteric venous gas or gastric emphysema is seen to suggest ischemic bowel. No mechanical bowel obstruction is suspected. No extraluminal intraperitoneal (or extraperitoneal) fluid collection is seen to suggest abscess. No definite bowel wall thickening. No definite inflammatory changes are seen within the mesentery. There are small scattered mesenteric lymph nodes. There are pelvic phleboliths. Hepatic cysts are noted, as before. No gallstones. No acute cholecystitis. No acute pancreatitis. No acute infiltrate is seen in the partially imaged lung bases. No acute fracture. No aggressive osseous lesion. Degenerative changes involve the lumbar spine in the bilateral sacroiliac (SI) joints.       Nonspecific fluid-filled stomach, small bowel, and colon are identified and may represent a generalized adynamic ileus. An age-indeterminate probably mild infectious/inflammatory gastroenterocolitis cannot be excluded. There has been interval decrease in the stool burden since the 3/31/2023 CT study. Otherwise, no definite significant acute findings. Please see the above comments for further detail.    Portions of this note were completed with a voice recognition program.  Electronically Signed By-Hermann Chou MD On:10/20/2024 4:01 AM      XR Abdomen Flat & Upright    Result Date:  10/19/2024  XR ABDOMEN FLAT AND UPRIGHT-  Date of exam: 10/19/2024, 10:13 P.M.  Indications: Unspecified abd. pain; +fever; diarrhea; possible Crohn's disease; initial presentation.  Comparison: 3/31/2023.  FINDINGS: Three (3) views of the abdomen and pelvis, upright and supine, reveal no pneumoperitoneum and no mechanical bowel obstruction. No significant stool burden is seen. There are pelvic phleboliths. The partially imaged lungs are clear of acute infiltrate. No cardiac enlargement. No pleural effusion. Degenerative changes involve the imaged spine. There may be transitional changes at the lumbosacral junction.       The bowel gas pattern is nonobstructive. No pneumoperitoneum.   Portions of this note were completed with a voice recognition program.     Electronically Signed By-Hermann Chou MD On:10/19/2024 10:38 PM         Differential Diagnosis and Discussion:    Abdominal Pain: Based on the patient's signs and symptoms, I considered abdominal aortic aneurysm, small bowel obstruction, pancreatitis, acute cholecystitis, acute appendecitis, peptic ulcer disease, gastritis, colitis, endocrine disorders, irritable bowel syndrome and other differential diagnosis an etiology of the patient's abdominal pain.    All labs were reviewed and interpreted by me.  All X-rays impressions were independently interpreted by me.    MDM  Number of Diagnoses or Management Options  Abdominal pain, unspecified abdominal location  Constipation, unspecified constipation type  Irritable bowel syndrome without diarrhea  Diagnosis management comments: The patient's CMP was reviewed and shows no abnormalities of critical concern.  Of note, the patient's sodium and potassium are acceptable.  The patient's liver enzymes are unremarkable.  The patient's renal function including creatinine is preserved.  The patient has a normal anion gap.    The patient's CBC was reviewed and shows no abnormalities of critical concern.  Of note, there is  no anemia requiring a blood transfusion and the platelet count is acceptable    The patient had a normal lactate which makes severe sepsis and ischemic bowel unlikely    The patient had a normal lipase which makes acute pancreatitis unlikely    An x-ray of the bowel was performed which demonstrated a nonobstructive pattern there is no pneumoperitoneum.  I did feel there was a fair amount of stool on the x-ray.    I discussed the x-ray and labs with the patient.  The patient felt most likely her pain and symptoms were constipation as she has not had a bowel movement in 2 weeks.  We discussed performing a CT scan versus trying to give her an enema and given her laxative.  The patient states that she would prefer to treat the constipation first.  The patient was given a soapsuds enema, mag citrate and Amitiza.  The patient initially had a bowel movement and then had a fair amount of loose stool.  The patient was reassessed.  The patient states that she was improved but still having abdominal pain.  At this point, she consented to CT scan to rule out possible bowel obstruction or other etiology    CT scan of the abdomen pelvis demonstrates nonspecific fluid-filled stomach, small bowel and colon.  It was thought to represent a generalized ileus.  There is no evidence of bowel obstruction or other acute abnormalities.    After the CT scan the patient was reassessed.  The patient states that she is feeling better than arrival.  She does feel that this is related to her irritable bowel syndrome.  I will have her follow-up with her gastroenterologist Dr. Duggan on Monday.  I also have the patient follow-up with her doctor on Monday for serial reexamination the abdomen.    The patient was given very specific instructions on when and why to return to the emergency room.  The patient voiced understanding and felt comfortable with the discharge instructions.  They would return to the emergency room if necessary.  The patient  appears appropriate for discharge and outpatient follow-up.         Amount and/or Complexity of Data Reviewed  Clinical lab tests: reviewed  Tests in the radiology section of CPT®: reviewed  Tests in the medicine section of CPT®: reviewed             Social Determinants of Health:    Patient is independent, reliable, and has access to care.       Disposition and Care Coordination:    Discharged: The patient is suitable and stable for discharge with no need for consideration of admission.    I have explained discharge medications and the need for follow up with the patient/caretakers. This was also printed in the discharge instructions. Patient was discharged with the following medications and follow up:      Medication List      No changes were made to your prescriptions during this visit.      Xiang Rico MD  [address] [facility] [address]  [phone]    On 10/21/2024  Serial reexamination of the abdomen    Tiny Cruz MD  [address] [facility] [id number] [phone]    Call on 10/21/2024  Irritable bowel syndrome and constipation       Final diagnoses:   Abdominal pain, unspecified abdominal location   Irritable bowel syndrome without diarrhea   Constipation, unspecified constipation type        ED Disposition       ED Disposition   Discharge    Condition   Stable    Comment   --               This medical record created using voice recognition software.             Beni Luke DO  10/20/24 0545

## 2024-10-20 NOTE — DISCHARGE INSTRUCTIONS
Please follow-up with your doctor tomorrow for serial reexamination the abdomen.      Please return to the emergency room immediately for intractable pain, intractable vomiting, fever, shaking chills, muscle aches, near passing out, passing out or any new symptoms you are concerned with

## 2024-12-11 PROCEDURE — 87081 CULTURE SCREEN ONLY: CPT | Performed by: PHYSICIAN ASSISTANT

## 2025-05-13 ENCOUNTER — APPOINTMENT (OUTPATIENT)
Dept: GENERAL RADIOLOGY | Facility: HOSPITAL | Age: 60
End: 2025-05-13
Payer: OTHER GOVERNMENT

## 2025-05-13 ENCOUNTER — HOSPITAL ENCOUNTER (EMERGENCY)
Facility: HOSPITAL | Age: 60
Discharge: HOME | End: 2025-05-14
Attending: EMERGENCY MEDICINE | Admitting: EMERGENCY MEDICINE
Payer: OTHER GOVERNMENT

## 2025-05-13 ENCOUNTER — APPOINTMENT (OUTPATIENT)
Dept: CT IMAGING | Facility: HOSPITAL | Age: 60
End: 2025-05-13
Payer: OTHER GOVERNMENT

## 2025-05-13 DIAGNOSIS — R42 DIZZINESS: ICD-10-CM

## 2025-05-13 DIAGNOSIS — R53.1 WEAKNESS: Primary | ICD-10-CM

## 2025-05-13 DIAGNOSIS — R56.9 SEIZURE-LIKE ACTIVITY: ICD-10-CM

## 2025-05-13 DIAGNOSIS — K52.9 GASTROENTERITIS: ICD-10-CM

## 2025-05-13 LAB
ALBUMIN SERPL-MCNC: 4.9 G/DL (ref 3.5–5.2)
ALBUMIN/GLOB SERPL: 1.4 G/DL
ALP SERPL-CCNC: 92 U/L (ref 39–117)
ALT SERPL W P-5'-P-CCNC: 11 U/L (ref 1–33)
ANION GAP SERPL CALCULATED.3IONS-SCNC: 14.5 MMOL/L (ref 5–15)
AST SERPL-CCNC: 20 U/L (ref 1–32)
BACTERIA UR QL AUTO: ABNORMAL /HPF
BASOPHILS # BLD AUTO: 0.01 10*3/MM3 (ref 0–0.2)
BASOPHILS NFR BLD AUTO: 0.1 % (ref 0–1.5)
BILIRUB SERPL-MCNC: 0.5 MG/DL (ref 0–1.2)
BILIRUB UR QL STRIP: NEGATIVE
BUN SERPL-MCNC: 23 MG/DL (ref 6–20)
BUN/CREAT SERPL: 23.2 (ref 7–25)
CALCIUM SPEC-SCNC: 10.7 MG/DL (ref 8.6–10.5)
CHLORIDE SERPL-SCNC: 104 MMOL/L (ref 98–107)
CLARITY UR: CLEAR
CO2 SERPL-SCNC: 19.5 MMOL/L (ref 22–29)
COLOR UR: YELLOW
CREAT SERPL-MCNC: 0.99 MG/DL (ref 0.57–1)
DEPRECATED RDW RBC AUTO: 41 FL (ref 37–54)
EGFRCR SERPLBLD CKD-EPI 2021: 66.6 ML/MIN/1.73
EOSINOPHIL # BLD AUTO: 0.1 10*3/MM3 (ref 0–0.4)
EOSINOPHIL NFR BLD AUTO: 1.3 % (ref 0.3–6.2)
ERYTHROCYTE [DISTWIDTH] IN BLOOD BY AUTOMATED COUNT: 14.1 % (ref 12.3–15.4)
GLOBULIN UR ELPH-MCNC: 3.6 GM/DL
GLUCOSE BLDC GLUCOMTR-MCNC: 97 MG/DL (ref 70–99)
GLUCOSE SERPL-MCNC: 111 MG/DL (ref 65–99)
GLUCOSE UR STRIP-MCNC: NEGATIVE MG/DL
HCT VFR BLD AUTO: 44.2 % (ref 34–46.6)
HGB BLD-MCNC: 14.2 G/DL (ref 12–15.9)
HGB UR QL STRIP.AUTO: NEGATIVE
HOLD SPECIMEN: NORMAL
HOLD SPECIMEN: NORMAL
HYALINE CASTS UR QL AUTO: ABNORMAL /LPF
IMM GRANULOCYTES # BLD AUTO: 0.01 10*3/MM3 (ref 0–0.05)
IMM GRANULOCYTES NFR BLD AUTO: 0.1 % (ref 0–0.5)
KETONES UR QL STRIP: ABNORMAL
LEUKOCYTE ESTERASE UR QL STRIP.AUTO: ABNORMAL
LYMPHOCYTES # BLD AUTO: 1.38 10*3/MM3 (ref 0.7–3.1)
LYMPHOCYTES NFR BLD AUTO: 18.3 % (ref 19.6–45.3)
MAGNESIUM SERPL-MCNC: 2 MG/DL (ref 1.6–2.6)
MCH RBC QN AUTO: 26.2 PG (ref 26.6–33)
MCHC RBC AUTO-ENTMCNC: 32.1 G/DL (ref 31.5–35.7)
MCV RBC AUTO: 81.7 FL (ref 79–97)
MONOCYTES # BLD AUTO: 0.62 10*3/MM3 (ref 0.1–0.9)
MONOCYTES NFR BLD AUTO: 8.2 % (ref 5–12)
NEUTROPHILS NFR BLD AUTO: 5.44 10*3/MM3 (ref 1.7–7)
NEUTROPHILS NFR BLD AUTO: 72 % (ref 42.7–76)
NITRITE UR QL STRIP: NEGATIVE
NRBC BLD AUTO-RTO: 0 /100 WBC (ref 0–0.2)
PH UR STRIP.AUTO: <=5 [PH] (ref 5–8)
PLATELET # BLD AUTO: 397 10*3/MM3 (ref 140–450)
PMV BLD AUTO: 8.9 FL (ref 6–12)
POTASSIUM SERPL-SCNC: 3.7 MMOL/L (ref 3.5–5.2)
PROT SERPL-MCNC: 8.5 G/DL (ref 6–8.5)
PROT UR QL STRIP: ABNORMAL
RBC # BLD AUTO: 5.41 10*6/MM3 (ref 3.77–5.28)
RBC # UR STRIP: ABNORMAL /HPF
REF LAB TEST METHOD: ABNORMAL
SODIUM SERPL-SCNC: 138 MMOL/L (ref 136–145)
SP GR UR STRIP: >1.03 (ref 1–1.03)
SQUAMOUS #/AREA URNS HPF: ABNORMAL /HPF
TROPONIN T SERPL HS-MCNC: 8 NG/L
UROBILINOGEN UR QL STRIP: ABNORMAL
WBC # UR STRIP: ABNORMAL /HPF
WBC NRBC COR # BLD AUTO: 7.56 10*3/MM3 (ref 3.4–10.8)
WHOLE BLOOD HOLD COAG: NORMAL
WHOLE BLOOD HOLD SPECIMEN: NORMAL

## 2025-05-13 PROCEDURE — 80053 COMPREHEN METABOLIC PANEL: CPT | Performed by: EMERGENCY MEDICINE

## 2025-05-13 PROCEDURE — 83735 ASSAY OF MAGNESIUM: CPT | Performed by: EMERGENCY MEDICINE

## 2025-05-13 PROCEDURE — 96375 TX/PRO/DX INJ NEW DRUG ADDON: CPT

## 2025-05-13 PROCEDURE — 96374 THER/PROPH/DIAG INJ IV PUSH: CPT

## 2025-05-13 PROCEDURE — 82948 REAGENT STRIP/BLOOD GLUCOSE: CPT

## 2025-05-13 PROCEDURE — 25810000003 SODIUM CHLORIDE 0.9 % SOLUTION: Performed by: NURSE PRACTITIONER

## 2025-05-13 PROCEDURE — 96361 HYDRATE IV INFUSION ADD-ON: CPT

## 2025-05-13 PROCEDURE — 99284 EMERGENCY DEPT VISIT MOD MDM: CPT

## 2025-05-13 PROCEDURE — 93010 ELECTROCARDIOGRAM REPORT: CPT | Performed by: SPECIALIST

## 2025-05-13 PROCEDURE — 74176 CT ABD & PELVIS W/O CONTRAST: CPT

## 2025-05-13 PROCEDURE — 85025 COMPLETE CBC W/AUTO DIFF WBC: CPT

## 2025-05-13 PROCEDURE — 81001 URINALYSIS AUTO W/SCOPE: CPT | Performed by: EMERGENCY MEDICINE

## 2025-05-13 PROCEDURE — 93005 ELECTROCARDIOGRAM TRACING: CPT | Performed by: EMERGENCY MEDICINE

## 2025-05-13 PROCEDURE — 25010000002 DICYCLOMINE PER 20 MG: Performed by: NURSE PRACTITIONER

## 2025-05-13 PROCEDURE — 93005 ELECTROCARDIOGRAM TRACING: CPT

## 2025-05-13 PROCEDURE — 84484 ASSAY OF TROPONIN QUANT: CPT | Performed by: EMERGENCY MEDICINE

## 2025-05-13 PROCEDURE — 25010000002 FAMOTIDINE 10 MG/ML SOLUTION: Performed by: NURSE PRACTITIONER

## 2025-05-13 PROCEDURE — 71045 X-RAY EXAM CHEST 1 VIEW: CPT

## 2025-05-13 PROCEDURE — 25010000002 ONDANSETRON PER 1 MG: Performed by: NURSE PRACTITIONER

## 2025-05-13 PROCEDURE — 96372 THER/PROPH/DIAG INJ SC/IM: CPT

## 2025-05-13 RX ORDER — FAMOTIDINE 10 MG/ML
20 INJECTION, SOLUTION INTRAVENOUS ONCE
Status: COMPLETED | OUTPATIENT
Start: 2025-05-13 | End: 2025-05-13

## 2025-05-13 RX ORDER — ONDANSETRON 2 MG/ML
4 INJECTION INTRAMUSCULAR; INTRAVENOUS ONCE
Status: COMPLETED | OUTPATIENT
Start: 2025-05-13 | End: 2025-05-13

## 2025-05-13 RX ORDER — DICYCLOMINE HYDROCHLORIDE 10 MG/ML
20 INJECTION INTRAMUSCULAR ONCE
Status: COMPLETED | OUTPATIENT
Start: 2025-05-13 | End: 2025-05-13

## 2025-05-13 RX ORDER — SODIUM CHLORIDE 0.9 % (FLUSH) 0.9 %
10 SYRINGE (ML) INJECTION AS NEEDED
Status: DISCONTINUED | OUTPATIENT
Start: 2025-05-13 | End: 2025-05-14 | Stop reason: HOSPADM

## 2025-05-13 RX ADMIN — SODIUM CHLORIDE 1000 ML: 9 INJECTION, SOLUTION INTRAVENOUS at 22:14

## 2025-05-13 RX ADMIN — FAMOTIDINE 20 MG: 10 INJECTION INTRAVENOUS at 22:14

## 2025-05-13 RX ADMIN — DICYCLOMINE HYDROCHLORIDE 20 MG: 10 INJECTION, SOLUTION INTRAMUSCULAR at 22:14

## 2025-05-13 RX ADMIN — ONDANSETRON 4 MG: 2 INJECTION INTRAMUSCULAR; INTRAVENOUS at 22:14

## 2025-05-13 NOTE — Clinical Note
[facility] EMERGENCY ROOM  [address] [place] [address]  Phone: [phone]  Fax: [phone]    Parul Finn was seen and treated in our emergency department on 5/13/2025.  She may return to work on 05/15/2025.         Thank you for choosing [facility].    Beni Luke, DO

## 2025-05-14 ENCOUNTER — APPOINTMENT (OUTPATIENT)
Dept: CT IMAGING | Facility: HOSPITAL | Age: 60
End: 2025-05-14
Payer: OTHER GOVERNMENT

## 2025-05-14 VITALS
HEIGHT: 66 IN | WEIGHT: 177.47 LBS | DIASTOLIC BLOOD PRESSURE: 91 MMHG | RESPIRATION RATE: 18 BRPM | BODY MASS INDEX: 28.52 KG/M2 | TEMPERATURE: 98.4 F | OXYGEN SATURATION: 98 % | HEART RATE: 85 BPM | SYSTOLIC BLOOD PRESSURE: 128 MMHG

## 2025-05-14 LAB
GEN 5 1HR TROPONIN T REFLEX: 6 NG/L
QT INTERVAL: 326 MS
QTC INTERVAL: 436 MS
TROPONIN T NUMERIC DELTA: -2 NG/L

## 2025-05-14 PROCEDURE — 96375 TX/PRO/DX INJ NEW DRUG ADDON: CPT

## 2025-05-14 PROCEDURE — 25010000002 MIDAZOLAM PER 1MG

## 2025-05-14 PROCEDURE — 36415 COLL VENOUS BLD VENIPUNCTURE: CPT

## 2025-05-14 PROCEDURE — 25010000002 DIPHENHYDRAMINE PER 50 MG: Performed by: NURSE PRACTITIONER

## 2025-05-14 PROCEDURE — 25010000002 METOCLOPRAMIDE PER 10 MG: Performed by: NURSE PRACTITIONER

## 2025-05-14 PROCEDURE — 70450 CT HEAD/BRAIN W/O DYE: CPT

## 2025-05-14 PROCEDURE — 25010000002 KETOROLAC TROMETHAMINE PER 15 MG: Performed by: NURSE PRACTITIONER

## 2025-05-14 PROCEDURE — 84484 ASSAY OF TROPONIN QUANT: CPT | Performed by: EMERGENCY MEDICINE

## 2025-05-14 RX ORDER — ONDANSETRON 4 MG/1
4 TABLET, ORALLY DISINTEGRATING ORAL EVERY 8 HOURS PRN
Qty: 10 TABLET | Refills: 0 | Status: SHIPPED | OUTPATIENT
Start: 2025-05-14

## 2025-05-14 RX ORDER — PROMETHAZINE HYDROCHLORIDE 25 MG/1
25 SUPPOSITORY RECTAL EVERY 6 HOURS PRN
Qty: 10 SUPPOSITORY | Refills: 0 | Status: SHIPPED | OUTPATIENT
Start: 2025-05-14

## 2025-05-14 RX ORDER — DIPHENHYDRAMINE HYDROCHLORIDE 50 MG/ML
25 INJECTION, SOLUTION INTRAMUSCULAR; INTRAVENOUS ONCE
Status: COMPLETED | OUTPATIENT
Start: 2025-05-14 | End: 2025-05-14

## 2025-05-14 RX ORDER — MIDAZOLAM HYDROCHLORIDE 2 MG/2ML
INJECTION, SOLUTION INTRAMUSCULAR; INTRAVENOUS
Status: COMPLETED
Start: 2025-05-14 | End: 2025-05-14

## 2025-05-14 RX ORDER — METOCLOPRAMIDE HYDROCHLORIDE 5 MG/ML
10 INJECTION INTRAMUSCULAR; INTRAVENOUS ONCE
Status: COMPLETED | OUTPATIENT
Start: 2025-05-14 | End: 2025-05-14

## 2025-05-14 RX ORDER — KETOROLAC TROMETHAMINE 30 MG/ML
30 INJECTION, SOLUTION INTRAMUSCULAR; INTRAVENOUS ONCE
Status: COMPLETED | OUTPATIENT
Start: 2025-05-14 | End: 2025-05-14

## 2025-05-14 RX ORDER — MIDAZOLAM HYDROCHLORIDE 2 MG/2ML
2 INJECTION, SOLUTION INTRAMUSCULAR; INTRAVENOUS ONCE
Status: COMPLETED | OUTPATIENT
Start: 2025-05-14 | End: 2025-05-14

## 2025-05-14 RX ORDER — LOPERAMIDE HYDROCHLORIDE 2 MG/1
2 CAPSULE ORAL 4 TIMES DAILY PRN
Qty: 20 CAPSULE | Refills: 0 | Status: SHIPPED | OUTPATIENT
Start: 2025-05-14

## 2025-05-14 RX ORDER — DICYCLOMINE HCL 20 MG
20 TABLET ORAL EVERY 6 HOURS PRN
Qty: 30 TABLET | Refills: 0 | Status: SHIPPED | OUTPATIENT
Start: 2025-05-14

## 2025-05-14 RX ADMIN — MIDAZOLAM HYDROCHLORIDE 2 MG: 2 INJECTION, SOLUTION INTRAMUSCULAR; INTRAVENOUS at 01:10

## 2025-05-14 RX ADMIN — MIDAZOLAM HYDROCHLORIDE 2 MG: 1 INJECTION, SOLUTION INTRAMUSCULAR; INTRAVENOUS at 01:10

## 2025-05-14 RX ADMIN — METOCLOPRAMIDE HYDROCHLORIDE 10 MG: 5 INJECTION INTRAMUSCULAR; INTRAVENOUS at 01:01

## 2025-05-14 RX ADMIN — DIPHENHYDRAMINE HYDROCHLORIDE 25 MG: 50 INJECTION, SOLUTION INTRAMUSCULAR; INTRAVENOUS at 01:02

## 2025-05-14 RX ADMIN — KETOROLAC TROMETHAMINE 30 MG: 30 INJECTION, SOLUTION INTRAMUSCULAR; INTRAVENOUS at 01:02

## 2025-05-14 NOTE — ED PROVIDER NOTES
Time: 9:53 PM EDT  Date of encounter:  2025  Independent Historian/Clinical History and Information was obtained by:   Patient and Family    History is limited by: N/A    Chief Complaint: Vomiting and diarrhea      History of Present Illness:  Patient is a 59 y.o. year old female who presents to the emergency department for evaluation of patient states last night started having severe diarrhea and then this morning woke up and started vomiting and has been persistent all day.  Patient states that she has been vomiting she is having heartburn and now since she has been sick all day she developed a migraine for which she has a history of and this feels similar in the back of her head.  She states she generally feels like she has no energy and she feels dizzy when she tries to get up.  Pain in her abdomen is general diffuse and severe and is cramping and sharp in nature      Patient Care Team  Primary Care Provider: Xiang Rico MD    Past Medical History:     Allergies   Allergen Reactions    Adhesive Tape Rash    Iodinated Contrast Media Other (See Comments)     twitching    Oxycodone-Acetaminophen Hives     Past Medical History:   Diagnosis Date    Anxiety     Arthritis     Depression     Injury of back     Migraines     PTSD (post-traumatic stress disorder)      Past Surgical History:   Procedure Laterality Date    ABDOMINAL SURGERY      CARPAL TUNNEL RELEASE       SECTION      KNEE ACL RECONSTRUCTION      OTHER SURGICAL HISTORY      right dorsal compartment release    REPLACEMENT TOTAL KNEE BILATERAL      right knee 25    REVISION OF ABDOMINAL SCAR      TONSILLECTOMY       History reviewed. No pertinent family history.    Home Medications:  Prior to Admission medications    Medication Sig Start Date End Date Taking? Authorizing Provider   acetaminophen (TYLENOL) 325 MG tablet  24   Provider, MD Mercy Torre 225 MG/1.5ML solution auto-injector INJECT 1.5 MLS INTO THE SKIN EVERY  30 DAYS.    Yelitza Crook MD   aspirin 81 MG EC tablet Take 1 tablet by mouth.    Yelitza Crook MD   bisacodyl (DULCOLAX) 10 MG suppository     Yelitza Crook MD   butalbital-acetaminophen-caffeine (FIORICET, ESGIC) -40 MG per tablet Take 1 tablet by mouth.    Yelitza Crook MD   carboxymethylcellulose (REFRESH PLUS) 0.5 % solution 3 (Three) Times a Day As Needed for Dry Eyes.    Yelitza Crook MD   cefadroxil (DURICEF) 500 MG capsule TAKE 1 CAPSULE BY MOUTH EVERY 12 HOURS FOR 14 DAYS 12/7/24   Yelitza Crook MD   celecoxib (CeleBREX) 50 MG capsule Take 1 capsule by mouth.    Yelitza Crook MD   docusate sodium 100 MG capsule Take 100 mg by mouth.    Emergency, Nurse Epic, RN   HYDROcodone-acetaminophen (NORCO)  MG per tablet Take 1 tablet by mouth Every 6 (Six) Hours As Needed for Moderate Pain.    Yelitza Crook MD   HYDROcodone-acetaminophen (NORCO) 5-325 MG per tablet Take 1 tablet by mouth Every 6 (Six) Hours As Needed. for pain 12/9/24   Yelitza Crook MD   ibuprofen (ADVIL,MOTRIN) 800 MG tablet Take 1 tablet by mouth 3 (Three) Times a Day.    Yelitza Crook MD   lactulose (CHRONULAC) 10 GM/15ML solution TAKE 30 ML (20 G TOTAL) BY MOUTH 2 (TWO) TIMES A DAY FOR 15 DAYS. 9/17/24   Yelitza Crook MD   Lidocaine ( Lidocaine Patch) 4 % patch Apply  topically.    Yelitza Crook MD   methocarbamol (ROBAXIN) 500 MG tablet TAKE 1.5 TABLETS BY MOUTH EVERY 8 HOURS FOR 30 DAYS AS NEEDED FOR MUSCLE SPASMS 12/7/24   Yelitza Crook MD   metoclopramide (REGLAN) 10 MG tablet Take 1 tablet by mouth 4 (Four) Times a Day Before Meals & at Bedtime.    Yelitza Crook MD   metoprolol tartrate (LOPRESSOR) 25 MG tablet Take 1 tablet by mouth. 11/14/24   Yelitza Crook MD   nortriptyline (PAMELOR) 10 MG capsule Take 1 capsule by mouth Every Night.    Yelitza Crook MD   ondansetron (ZOFRAN) 4 MG tablet Take   by mouth.    Emergency, Nurse WILLARD Chavez   oxyCODONE (ROXICODONE) 5 MG immediate release tablet Take 1 tablet by mouth. 12/6/24   Yelitza Crook MD   pregabalin (LYRICA) 75 MG capsule Take 1 capsule by mouth.    Yelitza Crook MD   rizatriptan (MAXALT) 10 MG tablet May cause drowsiness.Take or use exactly as directed. 1/24/24 1/15/25  Yelitza Crook MD   SUMAtriptan-naproxen (TREXIMET)  MG per tablet Take 1 tablet by mouth.    Yelitza Crook MD   topiramate (Topamax) 100 MG tablet Take 1 tablet by mouth 2 (Two) Times a Day.    Yelitza Crook MD   traMADol (ULTRAM) 50 MG tablet Take  by mouth.    Emergency, Nurse WILLARD Chavez   Trulance 3 MG tablet Take 1 tablet by mouth Daily. 11/26/24 5/25/25  Yelitza Crook MD   ubrogepant (UBRELVY) 100 MG tablet Take 1 tablet by mouth. 9/23/24 9/23/25  Yelitza Crook MD        Social History:   Social History     Tobacco Use    Smoking status: Never    Smokeless tobacco: Never   Vaping Use    Vaping status: Never Used   Substance Use Topics    Alcohol use: Never    Drug use: Never         Review of Systems:  Review of Systems   Constitutional:  Positive for chills and fatigue. Negative for fever.   HENT:  Negative for congestion, ear pain, sinus pressure, sinus pain, sneezing and sore throat.    Respiratory:  Negative for cough.    Cardiovascular:  Positive for chest pain (heartburn since vomiting).   Gastrointestinal:  Positive for abdominal pain (cramping and sharp), diarrhea (since last night) and vomiting (since 10 am).   Genitourinary:  Negative for difficulty urinating, dysuria and flank pain.   Musculoskeletal:  Negative for back pain.   Skin:  Negative for rash.   Neurological:  Positive for dizziness, weakness and headaches (migraine now).   Hematological: Negative.    Psychiatric/Behavioral: Negative.     All other systems reviewed and are negative.       Physical Exam:  /91 (BP Location: Right arm, Patient  "Position: Lying)   Pulse 103   Temp 98.6 °F (37 °C) (Oral)   Resp 18   Ht 167.6 cm (66\")   Wt 80.5 kg (177 lb 7.5 oz)   SpO2 97%   BMI 28.64 kg/m²     Physical Exam  Vitals and nursing note reviewed.   Constitutional:       General: She is not in acute distress.     Appearance: Normal appearance. She is not toxic-appearing.   HENT:      Head: Normocephalic and atraumatic.      Right Ear: Tympanic membrane, ear canal and external ear normal.      Left Ear: Tympanic membrane, ear canal and external ear normal.      Nose: Nose normal.      Mouth/Throat:      Mouth: Mucous membranes are moist.   Eyes:      General: No scleral icterus.     Conjunctiva/sclera: Conjunctivae normal.   Cardiovascular:      Rate and Rhythm: Regular rhythm. Tachycardia present.      Heart sounds: Normal heart sounds.   Pulmonary:      Effort: Pulmonary effort is normal. No respiratory distress.      Breath sounds: Normal breath sounds.   Abdominal:      General: Bowel sounds are normal.      Palpations: Abdomen is soft.      Tenderness: There is abdominal tenderness. There is no right CVA tenderness or left CVA tenderness.   Musculoskeletal:         General: Normal range of motion.      Cervical back: Normal range of motion and neck supple.   Skin:     General: Skin is warm and dry.   Neurological:      Mental Status: She is alert and oriented to person, place, and time.   Psychiatric:         Mood and Affect: Mood normal.         Behavior: Behavior normal.                Medical Decision Making:      Comorbidities that affect care:    Arthritis, anxiety, PTSD    External Notes reviewed:    Previous Clinic Note: Patient last seen by neurology on May 7 for chronic migraine      The following orders were placed and all results were independently analyzed by me:  Orders Placed This Encounter   Procedures    Enteric Bacterial Panel - Stool, Per Rectum    Clostridioides difficile Toxin - Stool, Per Rectum    Clostridioides difficile Toxin, " PCR - Stool, Per Rectum    XR Chest 1 View    CT Abdomen Pelvis Stone Protocol    CT Head Without Contrast    Solomon Draw    Comprehensive Metabolic Panel    High Sensitivity Troponin T    Magnesium    Urinalysis With Microscopic If Indicated (No Culture) - Urine, Clean Catch    CBC Auto Differential    High Sensitivity Troponin T 1Hr    Urinalysis, Microscopic Only - Urine, Clean Catch    Ambulatory Referral to Neurology    NPO Diet NPO Type: Strict NPO    Undress & Gown    Continuous Pulse Oximetry    Vital Signs    Orthostatic Blood Pressure    Oxygen Therapy- Nasal Cannula; Titrate 1-6 LPM Per SpO2; 90 - 95%    POC Glucose Once    POC Glucose Once    ECG 12 Lead Tachycardia    Insert Peripheral IV    Fall Precautions    CBC & Differential    Green Top (Gel)    Lavender Top    Gold Top - SST    Light Blue Top       Medications Given in the Emergency Department:  Medications   sodium chloride 0.9 % flush 10 mL (has no administration in time range)   ondansetron (ZOFRAN) injection 4 mg (4 mg Intravenous Given 5/13/25 2214)   famotidine (PEPCID) injection 20 mg (20 mg Intravenous Given 5/13/25 2214)   sodium chloride 0.9 % bolus 1,000 mL (0 mL Intravenous Stopped 5/13/25 2314)   dicyclomine (BENTYL) injection 20 mg (20 mg Intramuscular Given 5/13/25 2214)   metoclopramide (REGLAN) injection 10 mg (10 mg Intravenous Given 5/14/25 0101)   diphenhydrAMINE (BENADRYL) injection 25 mg (25 mg Intravenous Given 5/14/25 0102)   ketorolac (TORADOL) injection 30 mg (30 mg Intravenous Given 5/14/25 0102)   Midazolam HCl (PF) (VERSED) injection 2 mg (2 mg Intravenous Given 5/14/25 0110)        ED Course:    ED Course as of 05/14/25 0321   Tue May 13, 2025   2225 XR Chest 1 View  No acute findings   [DS]   2331 CT Abdomen Pelvis Stone Protocol  Ileus no obstruction [DS]   Wed May 14, 2025   0028 Patient feeling better other than now she states she just still has her migraine [DS]   0120 Migraine cocktail was being pushed for  patient's complaint of what felt like her normal migraines when she started having generalized shaking of her head and body.  Unsure if true seizure.  Versed was given cerebella was applied and patient will be sent to head CT.  Dr. Montero has evaluated the patient [DS]   0206 CT Head Without Contrast  No acute findings [DS]   0316 Cerebell had no seizure activity.  Patient is feeling back to baseline and not having any issues.  I have discussed possibly starting patient on Keppra but she would like to follow-up with her neurologist who manages her migraines and see if he feels like that they are needed. [DS]      ED Course User Index  [DS] Kristal Rivers APRN       Labs:    Lab Results (last 24 hours)       Procedure Component Value Units Date/Time    POC Glucose Once [[id number]]  (Normal) Collected: 05/13/25 2116    Specimen: Blood Updated: 05/13/25 2118     Glucose 97 mg/dL      Comment: Serial Number: [id number]:  [id number]       CBC & Differential [[id number]]  (Abnormal) Collected: 05/13/25 2143    Specimen: Blood Updated: 05/13/25 2150    Narrative:      The following orders were created for panel order CBC & Differential.  Procedure                               Abnormality         Status                     ---------                               -----------         ------                     CBC Auto Differential[[id number]]        Abnormal            Final result                 Please view results for these tests on the individual orders.    Comprehensive Metabolic Panel [[id number]]  (Abnormal) Collected: 05/13/25 2143    Specimen: Blood Updated: 05/13/25 2209     Glucose 111 mg/dL      BUN 23 mg/dL      Creatinine 0.99 mg/dL      Sodium 138 mmol/L      Potassium 3.7 mmol/L      Chloride 104 mmol/L      CO2 19.5 mmol/L      Calcium 10.7 mg/dL      Total Protein 8.5 g/dL      Albumin 4.9 g/dL      ALT (SGPT) 11 U/L      AST (SGOT) 20 U/L      Alkaline Phosphatase 92 U/L      Total Bilirubin 0.5 mg/dL       Globulin 3.6 gm/dL      A/G Ratio 1.4 g/dL      BUN/Creatinine Ratio 23.2     Anion Gap 14.5 mmol/L      eGFR 66.6 mL/min/1.73     Narrative:      GFR Categories in Chronic Kidney Disease (CKD)              GFR Category          GFR (mL/min/1.73)    Interpretation  G1                    90 or greater        Normal or high (1)  G2                    60-89                Mild decrease (1)  G3a                   45-59                Mild to moderate decrease  G3b                   30-44                Moderate to severe decrease  G4                    15-29                Severe decrease  G5                    14 or less           Kidney failure    (1)In the absence of evidence of kidney disease, neither GFR category G1 or G2 fulfill the criteria for CKD.    eGFR calculation 2021 CKD-EPI creatinine equation, which does not include race as a factor    High Sensitivity Troponin T [[id number]]  (Normal) Collected: 05/13/25 2143    Specimen: Blood Updated: 05/13/25 2209     HS Troponin T 8 ng/L     Narrative:      High Sensitive Troponin T Reference Range:  <14.0 ng/L- Negative Female for AMI  <22.0 ng/L- Negative Male for AMI  >=14 - Abnormal Female indicating possible myocardial injury.  >=22 - Abnormal Male indicating possible myocardial injury.   Clinicians would have to utilize clinical acumen, EKG, Troponin, and serial changes to determine if it is an Acute Myocardial Infarction or myocardial injury due to an underlying chronic condition.         Magnesium [[id number]]  (Normal) Collected: 05/13/25 2143    Specimen: Blood Updated: 05/13/25 2209     Magnesium 2.0 mg/dL     CBC Auto Differential [[id number]]  (Abnormal) Collected: 05/13/25 2143    Specimen: Blood Updated: 05/13/25 2150     WBC 7.56 10*3/mm3      RBC 5.41 10*6/mm3      Hemoglobin 14.2 g/dL      Hematocrit 44.2 %      MCV 81.7 fL      MCH 26.2 pg      MCHC 32.1 g/dL      RDW 14.1 %      RDW-SD 41.0 fl      MPV 8.9 fL      Platelets 397 10*3/mm3       Neutrophil % 72.0 %      Lymphocyte % 18.3 %      Monocyte % 8.2 %      Eosinophil % 1.3 %      Basophil % 0.1 %      Immature Grans % 0.1 %      Neutrophils, Absolute 5.44 10*3/mm3      Lymphocytes, Absolute 1.38 10*3/mm3      Monocytes, Absolute 0.62 10*3/mm3      Eosinophils, Absolute 0.10 10*3/mm3      Basophils, Absolute 0.01 10*3/mm3      Immature Grans, Absolute 0.01 10*3/mm3      nRBC 0.0 /100 WBC     Urinalysis With Microscopic If Indicated (No Culture) - Urine, Clean Catch [[id number]]  (Abnormal) Collected: 05/13/25 2329    Specimen: Urine, Clean Catch Updated: 05/13/25 2339     Color, UA Yellow     Appearance, UA Clear     pH, UA <=5.0     Specific Gravity, UA >1.030     Glucose, UA Negative     Ketones, UA 15 mg/dL (1+)     Bilirubin, UA Negative     Blood, UA Negative     Protein, UA Trace     Leuk Esterase, UA Trace     Nitrite, UA Negative     Urobilinogen, UA 1.0 E.U./dL    Urinalysis, Microscopic Only - Urine, Clean Catch [[id number]]  (Abnormal) Collected: 05/13/25 2329    Specimen: Urine, Clean Catch Updated: 05/13/25 2339     RBC, UA 3-5 /HPF      WBC, UA 3-5 /HPF      Bacteria, UA None Seen /HPF      Squamous Epithelial Cells, UA 0-2 /HPF      Hyaline Casts, UA 0-2 /LPF      Methodology Automated Microscopy    High Sensitivity Troponin T 1Hr [[id number]]  (Normal) Collected: 05/14/25 0118    Specimen: Blood from Arm, Left Updated: 05/14/25 0140     HS Troponin T 6 ng/L      Troponin T Numeric Delta -2 ng/L     Narrative:      High Sensitive Troponin T Reference Range:  <14.0 ng/L- Negative Female for AMI  <22.0 ng/L- Negative Male for AMI  >=14 - Abnormal Female indicating possible myocardial injury.  >=22 - Abnormal Male indicating possible myocardial injury.   Clinicians would have to utilize clinical acumen, EKG, Troponin, and serial changes to determine if it is an Acute Myocardial Infarction or myocardial injury due to an underlying chronic condition.                  Imaging:    CT Head  Without Contrast  Result Date: 5/14/2025  CT HEAD WO CONTRAST HISTORY: Seizure and mental status change. Lethargy. TECHNIQUE: Axial unenhanced head CT with multiplanar reformats. Radiation dose reduction techniques included automated exposure control or exposure modulation based on body size. COMPARISON: None. FINDINGS: Ventricular size and configuration are normal. No acute infarct or hemorrhage is identified. There are no masses. There is no skull fracture.     Normal, negative unenhanced head CT. Electronically Signed: Eddie Sapp MD  5/14/2025 1:50 AM EDT  Workstation ID: [id number]    CT Abdomen Pelvis Stone Protocol  Result Date: 5/13/2025  CT ABDOMEN PELVIS STONE PROTOCOL Date of Exam: 5/13/2025 10:55 PM EDT Indication: Abdominal pain with vomiting and diarrhea. Comparison: 10/20/2024 Technique: Axial CT images were obtained of the abdomen and pelvis without the administration of contrast. Reconstructed coronal and sagittal images were also obtained. Automated exposure control and iterative construction methods were used. Findings: There is some minimal atelectasis in the right lower lobe. Lung bases are otherwise clear. Abdominal aorta is normal in caliber. Gallbladder is grossly normal with no biliary obstruction identified. Multiple hepatic cysts are unchanged. Solid abdominal organs otherwise have a normal unenhanced appearance. No renal or ureteral stones. No hydronephrosis. Urinary bladder is normal. Solid pelvic organs are normal. Multiple pelvic phleboliths are again seen. Postoperative changes are noted in the ventral abdominal wall. The appendix is not clearly identified, but there is no evidence of acute appendicitis. There is mild lower colonic diverticulosis. No evidence of diverticulitis or colitis. Gas-filled small bowel loops are noted, some of which are mildly distended suggesting a generalized small bowel ileus. No definite bowel obstruction. Some of the loops contain some feculent  appearing material suggesting stasis. Stomach is mildly distended with food debris and air. It is otherwise unremarkable. No adenopathy or free fluid is seen. There is lumbar degenerative disease.     1.Evidence of small bowel ileus without definite obstruction. 2.Normal large bowel. The appendix is not seen, but there is no evidence of acute appendicitis. 3.No renal or ureteral stones. No hydronephrosis. Electronically Signed: Eddie Sapp MD  5/13/2025 11:22 PM EDT  Workstation ID: [id number]    XR Chest 1 View  Result Date: 5/13/2025  XR CHEST 1 VW Date of Exam: 5/13/2025 9:52 PM EDT Indication: Weak/Dizzy/AMS triage protocol Comparison: CT chest 5/12/2023 Findings: Cardiac and mediastinal contours are normal. Pulmonary vascularity is normal. The lungs are clear. No pneumothorax.     No active disease. Electronically Signed: Eddie Sapp MD  5/13/2025 10:01 PM EDT  Workstation ID: [id number]        Differential Diagnosis and Discussion:    Abdominal Pain: Based on the patient's signs and symptoms, I considered abdominal aortic aneurysm, small bowel obstruction, pancreatitis, acute cholecystitis, acute appendecitis, peptic ulcer disease, gastritis, colitis, endocrine disorders, irritable bowel syndrome and other differential diagnosis an etiology of the patient's abdominal pain.  Diarrhea: Differential diagnosis includes but is not limited to malabsorption syndrome, bacterial infection, carcinoid syndrome, pancreatic hypersecretion, viral infection, celiac sprue, Crohn's disease, ulcerative colitis, ischemic colitis, colitis, hypermotility, and irritable bowel syndrome.  Vomiting: Differential diagnosis includes but is not limited to migraine, labyrinthine disorders, psychogenic, metabolic and endocrine causes, peptic ulcer, gastric outlet obstruction, gastritis, gastroenteritis, appendicitis, intestinal obstruction, paralytic ileus, food poisoning, cholecystitis, acute hepatitis, acute pancreatitis, acute  febrile illness, and myocardial infarction.    PROCEDURES:    Labs were collected in the emergency department and all labs were reviewed and interpreted by me.  X-ray were performed in the emergency department and all X-ray impressions were independently interpreted by me.  An EKG was performed and the EKG was interpreted by supervising attending.  CT scan was performed in the emergency department and the CT scan radiology impression was interpreted by me.    ECG 12 Lead Tachycardia   Preliminary Result   HEART RRKA=642  bpm   RR Xhhrvxyw=787  ms   WA Synohmev=393  ms   P Horizontal Axis=-15  deg   P Front Axis=49  deg   QRSD Interval=81  ms   QT Ymrcdizw=732  ms   WYpQ=225  ms   QRS Axis=44  deg   T Wave Axis=49  deg   - BORDERLINE ECG -   Sinus tachycardia   Probable left atrial enlargement   Date and Time of Study:2025-05-13 21:26:18          Procedures    MDM  Number of Diagnoses or Management Options  Dizziness  Gastroenteritis  Seizure-like activity  Weakness  Diagnosis management comments: The patient presents with vomiting and diarrhea consistent with gastroenteritis. The patient has a soft and benign abdominal exam. The patient is now resting comfortably and feels better, is alert, and is in no distress. The patient is able to tolerate po intake in the ED. The patient's labs were reviewed and hydration status was assessed. The patient has no signs of acute renal failure, sepsis, or dehydration that warrants admission to the hospital. The vital signs have been stable. The patient's condition is stable and appropriate for discharge. The patient will pursue further outpatient evaluation with the primary care physician or designated physician. The patient and/or caregivers have expressed a clear and thorough understanding and agreed to follow-up as instructed.       Amount and/or Complexity of Data Reviewed  Clinical lab tests: reviewed and ordered  Tests in the radiology section of CPT®: reviewed and  ordered  Tests in the medicine section of CPT®: reviewed and ordered    Risk of Complications, Morbidity, and/or Mortality  Presenting problems: moderate  Diagnostic procedures: moderate  Management options: low    Patient Progress  Patient progress: stable         Patient Care Considerations:    SEPSIS was considered but is NOT present in the emergency department as SIRS criteria is not present.      Consultants/Shared Management Plan:    SHARED VISIT: I have discussed the case with my supervising physician, dr miguel who states offered to start seizure medications and give her precautions or have her follow-up with neurology before starting meds. The substantive portion of the medical decision was made by the attesting physician who made or approve the management plan and will take responsibility for the patient.  Clinical findings were discussed and ultimate disposition was made in consult with supervising physician.    Social Determinants of Health:    Patient has presented with family members who are responsible, reliable and will ensure follow up care.      Disposition and Care Coordination:    Discharged: I considered escalation of care by admitting this patient to the hospital, however patient improved in the emergency department and is to baseline with no pain    I have explained the patient´s condition, diagnoses and treatment plan based on the information available to me at this time. I have answered questions and addressed any concerns. The patient has a good  understanding of the patient´s diagnosis, condition, and treatment plan as can be expected at this point. The vital signs have been stable. The patient´s condition is stable and appropriate for discharge from the emergency department.      The patient will pursue further outpatient evaluation with the primary care physician or other designated or consulting physician as outlined in the discharge instructions. They are agreeable to this plan of  care and follow-up instructions have been explained in detail. The patient has received these instructions in written format and has expressed an understanding of the discharge instructions. The patient is aware that any significant change in condition or worsening of symptoms should prompt an immediate return to this or the closest emergency department or call to 911.  I have explained discharge medications and the need for follow up with the patient/caretakers. This was also printed in the discharge instructions. Patient was discharged with the following medications and follow up:      Medication List        New Prescriptions      dicyclomine 20 MG tablet  Commonly known as: BENTYL  Take 1 tablet by mouth Every 6 (Six) Hours As Needed for Abdominal Cramping.     loperamide 2 MG capsule  Commonly known as: IMODIUM  Take 1 capsule by mouth 4 (Four) Times a Day As Needed for Diarrhea.     ondansetron ODT 4 MG disintegrating tablet  Commonly known as: ZOFRAN-ODT  Place 1 tablet on the tongue Every 8 (Eight) Hours As Needed for Nausea or Vomiting.     promethazine 25 MG suppository  Commonly known as: PHENERGAN  Insert 1 suppository into the rectum Every 6 (Six) Hours As Needed for Nausea or Vomiting.               Where to Get Your Medications        These medications were sent to [facility]/pharmacy #[id number] - [place], [place] - [address] - [phone]  - [phone]   [address] [place] [address]      Hours: 24-hours Phone: [phone]   dicyclomine 20 MG tablet  loperamide 2 MG capsule  ondansetron ODT 4 MG disintegrating tablet  promethazine 25 MG suppository      Noah Cardenas MD  [facility]  [address] [place] [address] [phone]             Final diagnoses:   Weakness   Dizziness   Gastroenteritis   Seizure-like activity        ED Disposition       ED Disposition   Discharge    Condition   Stable    Comment   --               This medical record created using voice  recognition software.             Kristal Rivers, PIOTR  05/14/25 [id number]

## 2025-05-14 NOTE — ED PROVIDER NOTES
"SHARED VISIT ATTESTATION:    This visit was performed by myself and an APC.  I performed the substantive portion of the medical decision making.  The management plan was made or approved by me, and I take responsibility for patient management.      SHARED VISIT NOTE:    Patient is 59 y.o. year old female that presents to the ED for evaluation of vomiting and diarrhea.     Physical Exam  Constitutional:       Appearance: Normal appearance.   HENT:      Head: Normocephalic and atraumatic.      Nose: Nose normal.      Mouth/Throat:      Mouth: Mucous membranes are moist.   Eyes:      Extraocular Movements: Extraocular movements intact.      Conjunctiva/sclera: Conjunctivae normal.      Pupils: Pupils are equal, round, and reactive to light.   Cardiovascular:      Rate and Rhythm: Normal rate and regular rhythm.      Pulses: Normal pulses.      Heart sounds: Normal heart sounds.   Pulmonary:      Effort: Pulmonary effort is normal.      Breath sounds: Normal breath sounds.   Abdominal:      General: There is no distension.      Palpations: Abdomen is soft.      Tenderness: There is no abdominal tenderness.   Musculoskeletal:         General: Normal range of motion.      Cervical back: Normal range of motion.   Skin:     General: Skin is warm and dry.      Capillary Refill: Capillary refill takes less than 2 seconds.   Neurological:      General: No focal deficit present.      Mental Status: She is alert and oriented to person, place, and time. Mental status is at baseline.   Psychiatric:         Mood and Affect: Mood normal.         Behavior: Behavior normal.         ED Course:    /91 (BP Location: Right arm, Patient Position: Lying)   Pulse 103   Temp 98.6 °F (37 °C) (Oral)   Resp 18   Ht 167.6 cm (66\")   Wt 80.5 kg (177 lb 7.5 oz)   SpO2 97%   BMI 28.64 kg/m²       The following orders were placed and all results were independently analyzed by me:  Orders Placed This Encounter   Procedures    Enteric " Bacterial Panel - Stool, Per Rectum    Clostridioides difficile Toxin - Stool, Per Rectum    Clostridioides difficile Toxin, PCR - Stool, Per Rectum    XR Chest 1 View    CT Abdomen Pelvis Stone Protocol    CT Head Without Contrast    [place] Draw    Comprehensive Metabolic Panel    High Sensitivity Troponin T    Magnesium    Urinalysis With Microscopic If Indicated (No Culture) - Urine, Clean Catch    CBC Auto Differential    High Sensitivity Troponin T 1Hr    Urinalysis, Microscopic Only - Urine, Clean Catch    Ambulatory Referral to Neurology    NPO Diet NPO Type: Strict NPO    Undress & Gown    Continuous Pulse Oximetry    Vital Signs    Orthostatic Blood Pressure    Oxygen Therapy- Nasal Cannula; Titrate 1-6 LPM Per SpO2; 90 - 95%    POC Glucose Once    POC Glucose Once    ECG 12 Lead Tachycardia    Insert Peripheral IV    Fall Precautions    CBC & Differential    Green Top (Gel)    Lavender Top    Gold Top - SST    Light Blue Top       Medications Given in the Emergency Department:  Medications   sodium chloride 0.9 % flush 10 mL (has no administration in time range)   ondansetron (ZOFRAN) injection 4 mg (4 mg Intravenous Given 5/13/25 2214)   famotidine (PEPCID) injection 20 mg (20 mg Intravenous Given 5/13/25 2214)   sodium chloride 0.9 % bolus 1,000 mL (0 mL Intravenous Stopped 5/13/25 2314)   dicyclomine (BENTYL) injection 20 mg (20 mg Intramuscular Given 5/13/25 2214)   metoclopramide (REGLAN) injection 10 mg (10 mg Intravenous Given 5/14/25 0101)   diphenhydrAMINE (BENADRYL) injection 25 mg (25 mg Intravenous Given 5/14/25 0102)   ketorolac (TORADOL) injection 30 mg (30 mg Intravenous Given 5/14/25 0102)   Midazolam HCl (PF) (VERSED) injection 2 mg (2 mg Intravenous Given 5/14/25 0110)        ED Course:    ED Course as of 05/14/25 0336   Tue May 13, 2025   2225 XR Chest 1 View  No acute findings   [DS]   2331 CT Abdomen Pelvis Stone Protocol  Ileus no obstruction [DS]   Wed May 14, 2025   0028 Patient  feeling better other than now she states she just still has her migraine [DS]   0120 Migraine cocktail was being pushed for patient's complaint of what felt like her normal migraines when she started having generalized shaking of her head and body.  Unsure if true seizure.  Versed was given cerebella was applied and patient will be sent to head CT.  Dr. Montero has evaluated the patient [DS]   0206 CT Head Without Contrast  No acute findings [DS]   0316 Cerebell had no seizure activity.  Patient is feeling back to baseline and not having any issues.  I have discussed possibly starting patient on Keppra but she would like to follow-up with her neurologist who manages her migraines and see if he feels like that they are needed. [DS]      ED Course User Index  [DS] Kristal Rivers APRN       Labs:    Lab Results (last 24 hours)       Procedure Component Value Units Date/Time    POC Glucose Once [[id number]]  (Normal) Collected: 05/13/25 2116    Specimen: Blood Updated: 05/13/25 2118     Glucose 97 mg/dL      Comment: Serial Number: [id number]:  [id number]       CBC & Differential [[id number]]  (Abnormal) Collected: 05/13/25 2143    Specimen: Blood Updated: 05/13/25 2150    Narrative:      The following orders were created for panel order CBC & Differential.  Procedure                               Abnormality         Status                     ---------                               -----------         ------                     CBC Auto Differential[[id number]]        Abnormal            Final result                 Please view results for these tests on the individual orders.    Comprehensive Metabolic Panel [[id number]]  (Abnormal) Collected: 05/13/25 2143    Specimen: Blood Updated: 05/13/25 2209     Glucose 111 mg/dL      BUN 23 mg/dL      Creatinine 0.99 mg/dL      Sodium 138 mmol/L      Potassium 3.7 mmol/L      Chloride 104 mmol/L      CO2 19.5 mmol/L      Calcium 10.7 mg/dL      Total Protein 8.5 g/dL       Albumin 4.9 g/dL      ALT (SGPT) 11 U/L      AST (SGOT) 20 U/L      Alkaline Phosphatase 92 U/L      Total Bilirubin 0.5 mg/dL      Globulin 3.6 gm/dL      A/G Ratio 1.4 g/dL      BUN/Creatinine Ratio 23.2     Anion Gap 14.5 mmol/L      eGFR 66.6 mL/min/1.73     Narrative:      GFR Categories in Chronic Kidney Disease (CKD)              GFR Category          GFR (mL/min/1.73)    Interpretation  G1                    90 or greater        Normal or high (1)  G2                    60-89                Mild decrease (1)  G3a                   45-59                Mild to moderate decrease  G3b                   30-44                Moderate to severe decrease  G4                    15-29                Severe decrease  G5                    14 or less           Kidney failure    (1)In the absence of evidence of kidney disease, neither GFR category G1 or G2 fulfill the criteria for CKD.    eGFR calculation 2021 CKD-EPI creatinine equation, which does not include race as a factor    High Sensitivity Troponin T [[id number]]  (Normal) Collected: 05/13/25 2143    Specimen: Blood Updated: 05/13/25 2209     HS Troponin T 8 ng/L     Narrative:      High Sensitive Troponin T Reference Range:  <14.0 ng/L- Negative Female for AMI  <22.0 ng/L- Negative Male for AMI  >=14 - Abnormal Female indicating possible myocardial injury.  >=22 - Abnormal Male indicating possible myocardial injury.   Clinicians would have to utilize clinical acumen, EKG, Troponin, and serial changes to determine if it is an Acute Myocardial Infarction or myocardial injury due to an underlying chronic condition.         Magnesium [[id number]]  (Normal) Collected: 05/13/25 2143    Specimen: Blood Updated: 05/13/25 2209     Magnesium 2.0 mg/dL     CBC Auto Differential [[id number]]  (Abnormal) Collected: 05/13/25 2143    Specimen: Blood Updated: 05/13/25 2150     WBC 7.56 10*3/mm3      RBC 5.41 10*6/mm3      Hemoglobin 14.2 g/dL      Hematocrit 44.2 %      MCV 81.7  fL      MCH 26.2 pg      MCHC 32.1 g/dL      RDW 14.1 %      RDW-SD 41.0 fl      MPV 8.9 fL      Platelets 397 10*3/mm3      Neutrophil % 72.0 %      Lymphocyte % 18.3 %      Monocyte % 8.2 %      Eosinophil % 1.3 %      Basophil % 0.1 %      Immature Grans % 0.1 %      Neutrophils, Absolute 5.44 10*3/mm3      Lymphocytes, Absolute 1.38 10*3/mm3      Monocytes, Absolute 0.62 10*3/mm3      Eosinophils, Absolute 0.10 10*3/mm3      Basophils, Absolute 0.01 10*3/mm3      Immature Grans, Absolute 0.01 10*3/mm3      nRBC 0.0 /100 WBC     Urinalysis With Microscopic If Indicated (No Culture) - Urine, Clean Catch [[id number]]  (Abnormal) Collected: 05/13/25 2329    Specimen: Urine, Clean Catch Updated: 05/13/25 2339     Color, UA Yellow     Appearance, UA Clear     pH, UA <=5.0     Specific Gravity, UA >1.030     Glucose, UA Negative     Ketones, UA 15 mg/dL (1+)     Bilirubin, UA Negative     Blood, UA Negative     Protein, UA Trace     Leuk Esterase, UA Trace     Nitrite, UA Negative     Urobilinogen, UA 1.0 E.U./dL    Urinalysis, Microscopic Only - Urine, Clean Catch [[id number]]  (Abnormal) Collected: 05/13/25 2329    Specimen: Urine, Clean Catch Updated: 05/13/25 2339     RBC, UA 3-5 /HPF      WBC, UA 3-5 /HPF      Bacteria, UA None Seen /HPF      Squamous Epithelial Cells, UA 0-2 /HPF      Hyaline Casts, UA 0-2 /LPF      Methodology Automated Microscopy    High Sensitivity Troponin T 1Hr [[id number]]  (Normal) Collected: 05/14/25 0118    Specimen: Blood from Arm, Left Updated: 05/14/25 0140     HS Troponin T 6 ng/L      Troponin T Numeric Delta -2 ng/L     Narrative:      High Sensitive Troponin T Reference Range:  <14.0 ng/L- Negative Female for AMI  <22.0 ng/L- Negative Male for AMI  >=14 - Abnormal Female indicating possible myocardial injury.  >=22 - Abnormal Male indicating possible myocardial injury.   Clinicians would have to utilize clinical acumen, EKG, Troponin, and serial changes to determine if it is an Acute  Myocardial Infarction or myocardial injury due to an underlying chronic condition.                  Imaging:    CT Head Without Contrast  Result Date: 5/14/2025  CT HEAD WO CONTRAST HISTORY: Seizure and mental status change. Lethargy. TECHNIQUE: Axial unenhanced head CT with multiplanar reformats. Radiation dose reduction techniques included automated exposure control or exposure modulation based on body size. COMPARISON: None. FINDINGS: Ventricular size and configuration are normal. No acute infarct or hemorrhage is identified. There are no masses. There is no skull fracture.     Normal, negative unenhanced head CT. Electronically Signed: Eddie Sapp MD  5/14/2025 1:50 AM EDT  Workstation ID: [id number]    CT Abdomen Pelvis Stone Protocol  Result Date: 5/13/2025  CT ABDOMEN PELVIS STONE PROTOCOL Date of Exam: 5/13/2025 10:55 PM EDT Indication: Abdominal pain with vomiting and diarrhea. Comparison: 10/20/2024 Technique: Axial CT images were obtained of the abdomen and pelvis without the administration of contrast. Reconstructed coronal and sagittal images were also obtained. Automated exposure control and iterative construction methods were used. Findings: There is some minimal atelectasis in the right lower lobe. Lung bases are otherwise clear. Abdominal aorta is normal in caliber. Gallbladder is grossly normal with no biliary obstruction identified. Multiple hepatic cysts are unchanged. Solid abdominal organs otherwise have a normal unenhanced appearance. No renal or ureteral stones. No hydronephrosis. Urinary bladder is normal. Solid pelvic organs are normal. Multiple pelvic phleboliths are again seen. Postoperative changes are noted in the ventral abdominal wall. The appendix is not clearly identified, but there is no evidence of acute appendicitis. There is mild lower colonic diverticulosis. No evidence of diverticulitis or colitis. Gas-filled small bowel loops are noted, some of which are mildly distended  suggesting a generalized small bowel ileus. No definite bowel obstruction. Some of the loops contain some feculent appearing material suggesting stasis. Stomach is mildly distended with food debris and air. It is otherwise unremarkable. No adenopathy or free fluid is seen. There is lumbar degenerative disease.     1.Evidence of small bowel ileus without definite obstruction. 2.Normal large bowel. The appendix is not seen, but there is no evidence of acute appendicitis. 3.No renal or ureteral stones. No hydronephrosis. Electronically Signed: Eddie Sapp MD  5/13/2025 11:22 PM EDT  Workstation ID: [id number]    XR Chest 1 View  Result Date: 5/13/2025  XR CHEST 1 VW Date of Exam: 5/13/2025 9:52 PM EDT Indication: Weak/Dizzy/AMS triage protocol Comparison: CT chest 5/12/2023 Findings: Cardiac and mediastinal contours are normal. Pulmonary vascularity is normal. The lungs are clear. No pneumothorax.     No active disease. Electronically Signed: Eddie Sapp MD  5/13/2025 10:01 PM EDT  Workstation ID: [id number]      MDM:    Procedures    Labs were collected in the emergency department and all labs were reviewed and interpreted by me.  X-ray were performed in the emergency department and all X-ray impressions were independently interpreted by me.  An EKG was performed and the EKG was interpreted by me.  CT scan was performed in the emergency department and the CT scan radiology impression was interpreted by me.                     Cindy Dai MD  03:36 EDT  05/14/25         Cindy Dai MD  05/14/25 0456

## 2025-05-14 NOTE — DISCHARGE INSTRUCTIONS
Clear liquids.  Advance diet as tolerated.    Follow-up with your neurologist.    Take medications as prescribed for symptomatic relief

## 2025-05-14 NOTE — ED TRIAGE NOTES
Patient arrived to ED c/o diarrhea/vomiting since last night. Patient c/o generalized weakness/ dizziness for the past couple of days. Patient BS is 97.